# Patient Record
Sex: MALE | Race: WHITE | Employment: STUDENT | URBAN - METROPOLITAN AREA
[De-identification: names, ages, dates, MRNs, and addresses within clinical notes are randomized per-mention and may not be internally consistent; named-entity substitution may affect disease eponyms.]

---
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
## 2018-01-09 ENCOUNTER — OFFICE VISIT (OUTPATIENT)
Dept: FAMILY MEDICINE CLINIC | Age: 18
End: 2018-01-09
Payer: COMMERCIAL

## 2018-01-09 VITALS
DIASTOLIC BLOOD PRESSURE: 70 MMHG | BODY MASS INDEX: 35 KG/M2 | SYSTOLIC BLOOD PRESSURE: 120 MMHG | WEIGHT: 250 LBS | TEMPERATURE: 98.7 F | OXYGEN SATURATION: 97 % | HEART RATE: 84 BPM

## 2018-01-09 VITALS
OXYGEN SATURATION: 98 % | TEMPERATURE: 98.5 F | WEIGHT: 248 LBS | BODY MASS INDEX: 34.72 KG/M2 | SYSTOLIC BLOOD PRESSURE: 120 MMHG | DIASTOLIC BLOOD PRESSURE: 78 MMHG | HEART RATE: 96 BPM

## 2018-01-09 DIAGNOSIS — F40.10 SOCIAL ANXIETY DISORDER: ICD-10-CM

## 2018-01-09 DIAGNOSIS — G43.009 MIGRAINE WITHOUT AURA AND WITHOUT STATUS MIGRAINOSUS, NOT INTRACTABLE: ICD-10-CM

## 2018-01-09 DIAGNOSIS — F33.0 MILD EPISODE OF RECURRENT MAJOR DEPRESSIVE DISORDER (HCC): Primary | ICD-10-CM

## 2018-01-09 PROCEDURE — 1036F TOBACCO NON-USER: CPT | Performed by: NURSE PRACTITIONER

## 2018-01-09 PROCEDURE — G8417 CALC BMI ABV UP PARAM F/U: HCPCS | Performed by: NURSE PRACTITIONER

## 2018-01-09 PROCEDURE — G8484 FLU IMMUNIZE NO ADMIN: HCPCS | Performed by: NURSE PRACTITIONER

## 2018-01-09 PROCEDURE — 99213 OFFICE O/P EST LOW 20 MIN: CPT | Performed by: NURSE PRACTITIONER

## 2018-01-09 PROCEDURE — G8427 DOCREV CUR MEDS BY ELIG CLIN: HCPCS | Performed by: NURSE PRACTITIONER

## 2018-01-09 RX ORDER — TOPIRAMATE 25 MG/1
CAPSULE, COATED PELLETS ORAL
Qty: 30 CAPSULE | Refills: 0 | Status: SHIPPED | OUTPATIENT
Start: 2018-01-09 | End: 2018-04-25 | Stop reason: SINTOL

## 2018-01-09 RX ORDER — BUPROPION HYDROCHLORIDE 150 MG/1
150 TABLET ORAL EVERY MORNING
Qty: 30 TABLET | Refills: 3 | Status: SHIPPED | OUTPATIENT
Start: 2018-01-09 | End: 2018-03-12 | Stop reason: SDUPTHER

## 2018-01-09 RX ORDER — AMITRIPTYLINE HYDROCHLORIDE 25 MG/1
25 TABLET, FILM COATED ORAL NIGHTLY
Qty: 30 TABLET | Refills: 3 | Status: SHIPPED | OUTPATIENT
Start: 2018-01-09 | End: 2018-03-12 | Stop reason: SINTOL

## 2018-01-09 ASSESSMENT — ENCOUNTER SYMPTOMS
RESPIRATORY NEGATIVE: 1
EYES NEGATIVE: 1
GASTROINTESTINAL NEGATIVE: 1
ALLERGIC/IMMUNOLOGIC NEGATIVE: 1

## 2018-01-09 NOTE — PROGRESS NOTES
(ELAVIL) 25 MG tablet Take 1 tablet by mouth nightly 30 tablet 3    topiramate (TOPAMAX SPRINKLE) 25 MG capsule For 1 week take two capsules at night. For second week take 1 capsule at night. 30 capsule 0    FLUoxetine (PROZAC) 20 MG/5ML solution Take 10 mLs by mouth daily 150 mL 3    naproxen (NAPROSYN) 500 MG tablet Take 1 tablet by mouth 2 times daily (with meals) 60 tablet 3     No current facility-administered medications for this visit. No Known Allergies    Health Maintenance   Topic Date Due    Flu vaccine (1) 11/07/2018 (Originally 9/1/2017)    HIV screen  11/07/2018 (Originally 1/11/2013)    DTaP/Tdap/Td vaccine (7 - Td) 09/15/2026    Meningococcal (MCV) Vaccine Age 0-22 Years  Completed          Review of Systems   Constitutional: Negative. HENT: Negative. Eyes: Negative. Respiratory: Negative. Cardiovascular: Negative. Gastrointestinal: Negative. Endocrine: Negative. Genitourinary: Negative. Musculoskeletal: Negative. Allergic/Immunologic: Negative. Neurological: Negative. Hematological: Negative. Psychiatric/Behavioral: Positive for suicidal ideas. Flat affect, depression       Objective:     /78 (Site: Left Arm, Position: Sitting, Cuff Size: Large Adult)   Pulse 96   Temp 98.5 °F (36.9 °C) (Oral)   Wt 248 lb (112.5 kg)   SpO2 98%   BMI 34.72 kg/m²   Physical Exam   Constitutional: He appears well-developed and well-nourished. HENT:   Head: Normocephalic. Musculoskeletal: Normal range of motion. Skin: Skin is warm and dry. Nursing note and vitals reviewed. Assessment:      1. Mild episode of recurrent major depressive disorder ([facility] [address]    2. Social anxiety disorder    3. Migraine without aura and without status migrainosus, not intractable                     Plan:   1. Mild episode of recurrent major depressive disorder (HCC)    - buPROPion (WELLBUTRIN XL) 150 MG extended release tablet;  Take 1 tablet by mouth every morning

## 2018-01-22 ENCOUNTER — TELEPHONE (OUTPATIENT)
Dept: FAMILY MEDICINE CLINIC | Age: 18
End: 2018-01-22

## 2018-02-12 ENCOUNTER — OFFICE VISIT (OUTPATIENT)
Dept: FAMILY MEDICINE CLINIC | Age: 18
End: 2018-02-12
Payer: COMMERCIAL

## 2018-02-12 VITALS
HEART RATE: 98 BPM | WEIGHT: 251 LBS | DIASTOLIC BLOOD PRESSURE: 70 MMHG | OXYGEN SATURATION: 99 % | TEMPERATURE: 98.4 F | SYSTOLIC BLOOD PRESSURE: 124 MMHG | BODY MASS INDEX: 35.14 KG/M2

## 2018-02-12 DIAGNOSIS — Z23 NEED FOR INFLUENZA VACCINATION: ICD-10-CM

## 2018-02-12 DIAGNOSIS — F33.0 MILD EPISODE OF RECURRENT MAJOR DEPRESSIVE DISORDER (HCC): Primary | ICD-10-CM

## 2018-02-12 DIAGNOSIS — G43.009 MIGRAINE WITHOUT AURA AND WITHOUT STATUS MIGRAINOSUS, NOT INTRACTABLE: ICD-10-CM

## 2018-02-12 PROCEDURE — 1036F TOBACCO NON-USER: CPT | Performed by: NURSE PRACTITIONER

## 2018-02-12 PROCEDURE — 99213 OFFICE O/P EST LOW 20 MIN: CPT | Performed by: NURSE PRACTITIONER

## 2018-02-12 PROCEDURE — G8427 DOCREV CUR MEDS BY ELIG CLIN: HCPCS | Performed by: NURSE PRACTITIONER

## 2018-02-12 PROCEDURE — G8417 CALC BMI ABV UP PARAM F/U: HCPCS | Performed by: NURSE PRACTITIONER

## 2018-02-12 PROCEDURE — 90688 IIV4 VACCINE SPLT 0.5 ML IM: CPT | Performed by: NURSE PRACTITIONER

## 2018-02-12 PROCEDURE — 90471 IMMUNIZATION ADMIN: CPT | Performed by: NURSE PRACTITIONER

## 2018-02-12 PROCEDURE — G8484 FLU IMMUNIZE NO ADMIN: HCPCS | Performed by: NURSE PRACTITIONER

## 2018-02-12 ASSESSMENT — ENCOUNTER SYMPTOMS
COUGH: 0
BACK PAIN: 0
GASTROINTESTINAL NEGATIVE: 1
RESPIRATORY NEGATIVE: 1
SHORTNESS OF BREATH: 0

## 2018-02-12 NOTE — PROGRESS NOTES
Patient is present for a 1 month follow up for depression. Patient states medication is helping and he does not feel depressed. Medications and pharmacy reviewed with patient. Patient did not know that he was suppose to take the amitriptyline. So he never started it and is feeling is fine doesn't feel like he needs the medication. Visit Information    Have you changed or started any medications since your last visit including any over-the-counter medicines, vitamins, or herbal medicines? no   Have you stopped taking any of your medications? Is so, why? -  no  Are you having any side effects from any of your medications? - no    Have you seen any other physician or provider since your last visit?  no   Have you had any other diagnostic tests since your last visit?  no   Have you been seen in the emergency room and/or had an admission in a hospital since we last saw you?  no   Have you had your routine dental cleaning in the past 6 months?  no     Do you have an active MyChart account? If no, what is the barrier?   Yes    Patient Care Team:  Mamta Reece NP as PCP - General    Medical History Review  Past Medical, Family, and Social History reviewed and does contribute to the patient presenting condition    Health Maintenance   Topic Date Due    Flu vaccine (1) 11/07/2018 (Originally 9/1/2017)    HIV screen  11/07/2018 (Originally 1/11/2013)    DTaP/Tdap/Td vaccine (7 - Td) 09/15/2026    Meningococcal (MCV) Vaccine Age 0-22 Years  Completed

## 2018-02-12 NOTE — PROGRESS NOTES
[facility]  [facility]  [address] [facility] [address] [address]  Dept: [phone]  Dept Fax: [phone]      Vivian Castaneda is a 19 y.o. male who presents today for his medical conditions/complaints as noted below. Vivian Castaneda is c/o of 1 Month Follow-Up and Depression      HPI:     HPI  Jan Dixon is here for follow up on depression. He did add the wellbutrin to his daily medication. He states that he feels that his mood has improved. He is feeling good about school, he is in a programming class and his classmate will ask for his guidance. He is not talking to anyone other in the class. Migraines-he did not start the amitriptyline due to being confused that it was an antidepressant. His mom did call to clarify but she was not on HIPPA. He is getting headaches, he states he has not had any that were migraine like. He cannot afford to miss class so he does want to take the medication. He did wean himself off of the topamax. No results found for: LABA1C          ( goal A1C is < 7)   No results found for: LABMICR  No results found for: LDLCHOLESTEROL, LDLCALC    (goal LDL is <100)   AST (U/L)   Date Value   07/01/2017 27     ALT (U/L)   Date Value   07/01/2017 46 (H)     BUN (mg/dL)   Date Value   07/01/2017 16     BP Readings from Last 3 Encounters:   02/12/18 124/70   01/09/18 120/78   12/07/17 120/70          (goal 120/80)    Past Medical History:   Diagnosis Date    Headache(784.0)     Obesity       No past surgical history on file.     Family History   Problem Relation Age of Onset    Depression Mother     High Blood Pressure Father     High Cholesterol Father     Heart Disease Father     Obesity Father     Other Father     Asthma Brother     Thyroid Disease Maternal Grandmother     Heart Disease Maternal Grandfather     Diabetes Maternal Grandfather        Social History   Substance Use Topics    Smoking status: Never Smoker    Smokeless tobacco: Never Used    Alcohol use No      Current Outpatient Prescriptions   Medication Sig Dispense Refill    buPROPion (WELLBUTRIN XL) 150 MG extended release tablet Take 1 tablet by mouth every morning 30 tablet 3    topiramate (TOPAMAX SPRINKLE) 25 MG capsule For 1 week take two capsules at night. For second week take 1 capsule at night. 30 capsule 0    FLUoxetine (PROZAC) 20 MG/5ML solution Take 10 mLs by mouth daily 150 mL 3    naproxen (NAPROSYN) 500 MG tablet Take 1 tablet by mouth 2 times daily (with meals) 60 tablet 3    amitriptyline (ELAVIL) 25 MG tablet Take 1 tablet by mouth nightly 30 tablet 3     No current facility-administered medications for this visit. No Known Allergies    Health Maintenance   Topic Date Due    HIV screen  11/07/2018 (Originally 1/11/2013)    DTaP/Tdap/Td vaccine (7 - Td) 09/15/2026    Meningococcal (MCV) Vaccine Age 0-22 Years  Completed    Flu vaccine  Completed          Review of Systems   Constitutional: Negative. HENT: Negative. Respiratory: Negative. Negative for cough and shortness of breath. Cardiovascular: Negative. Negative for chest pain. Gastrointestinal: Negative. Endocrine: Negative. Musculoskeletal: Negative. Negative for arthralgias and back pain. Neurological: Negative. Negative for seizures. Psychiatric/Behavioral: Positive for dysphoric mood. Negative for self-injury, sleep disturbance and suicidal ideas. Objective:     /70 (Site: Left Arm, Position: Sitting, Cuff Size: Large Adult)   Pulse 98   Temp 98.4 °F (36.9 °C) (Oral)   Wt 251 lb (113.9 kg)   SpO2 99%   BMI 35.14 kg/m²   Physical Exam   Constitutional: He appears well-developed and well-nourished. Obese     HENT:   Head: Normocephalic. Cardiovascular: Normal rate, regular rhythm and normal heart sounds. Pulmonary/Chest: Effort normal and breath sounds normal.   Musculoskeletal: Normal range of motion. Skin: Skin is warm and dry.    Psychiatric: He

## 2018-02-28 DIAGNOSIS — F33.0 MILD EPISODE OF RECURRENT MAJOR DEPRESSIVE DISORDER (HCC): ICD-10-CM

## 2018-02-28 DIAGNOSIS — F40.10 SOCIAL ANXIETY DISORDER: ICD-10-CM

## 2018-02-28 RX ORDER — FLUOXETINE HYDROCHLORIDE 20 MG/5ML
LIQUID ORAL
Qty: 150 ML | Refills: 3 | Status: SHIPPED | OUTPATIENT
Start: 2018-02-28 | End: 2018-03-12 | Stop reason: ALTCHOICE

## 2018-02-28 NOTE — TELEPHONE ENCOUNTER
Last visit 2-12-18  Next Visit Date:  Future Appointments  Date Time Provider Emilia Fish   3/12/2018 1:15 PM Miriam Maciel  [address] Maintenance   Topic Date Due    HIV screen  11/07/2018 (Originally 1/11/2013)    DTaP/Tdap/Td vaccine (7 - Td) 09/15/2026    Meningococcal (MCV) Vaccine Age 0-22 Years  Completed    Flu vaccine  Completed       No results found for: LABA1C          ( goal A1C is < 7)   No results found for: LABMICR  No results found for: LDLCHOLESTEROL, LDLCALC    (goal LDL is <100)   AST (U/L)   Date Value   07/01/2017 27     ALT (U/L)   Date Value   07/01/2017 46 (H)     BUN (mg/dL)   Date Value   07/01/2017 16     BP Readings from Last 3 Encounters:   02/12/18 124/70   01/09/18 120/78   12/07/17 120/70          (goal 120/80)    All Future Testing planned in [facility]              Patient Active Problem List:     Obesity     Skin lesions     Muscle strain     Depression     Social anxiety disorder     Concentration deficit     Migraine without aura and without status migrainosus, not intractable

## 2018-03-12 ENCOUNTER — OFFICE VISIT (OUTPATIENT)
Dept: FAMILY MEDICINE CLINIC | Age: 18
End: 2018-03-12
Payer: COMMERCIAL

## 2018-03-12 VITALS
OXYGEN SATURATION: 98 % | WEIGHT: 256 LBS | HEART RATE: 85 BPM | SYSTOLIC BLOOD PRESSURE: 110 MMHG | TEMPERATURE: 98 F | DIASTOLIC BLOOD PRESSURE: 68 MMHG | BODY MASS INDEX: 35.84 KG/M2

## 2018-03-12 DIAGNOSIS — F33.0 MILD EPISODE OF RECURRENT MAJOR DEPRESSIVE DISORDER (HCC): ICD-10-CM

## 2018-03-12 DIAGNOSIS — F40.10 SOCIAL ANXIETY DISORDER: ICD-10-CM

## 2018-03-12 PROCEDURE — 1036F TOBACCO NON-USER: CPT | Performed by: NURSE PRACTITIONER

## 2018-03-12 PROCEDURE — G8417 CALC BMI ABV UP PARAM F/U: HCPCS | Performed by: NURSE PRACTITIONER

## 2018-03-12 PROCEDURE — G8482 FLU IMMUNIZE ORDER/ADMIN: HCPCS | Performed by: NURSE PRACTITIONER

## 2018-03-12 PROCEDURE — 99213 OFFICE O/P EST LOW 20 MIN: CPT | Performed by: NURSE PRACTITIONER

## 2018-03-12 PROCEDURE — G8427 DOCREV CUR MEDS BY ELIG CLIN: HCPCS | Performed by: NURSE PRACTITIONER

## 2018-03-12 RX ORDER — AMITRIPTYLINE HYDROCHLORIDE 25 MG/1
25 TABLET, FILM COATED ORAL NIGHTLY
Qty: 30 TABLET | Refills: 3 | Status: CANCELLED | OUTPATIENT
Start: 2018-03-12

## 2018-03-12 RX ORDER — BUPROPION HYDROCHLORIDE 150 MG/1
150 TABLET ORAL EVERY MORNING
Qty: 30 TABLET | Refills: 3 | Status: SHIPPED | OUTPATIENT
Start: 2018-03-12 | End: 2018-06-25 | Stop reason: ALTCHOICE

## 2018-03-12 RX ORDER — FLUOXETINE HYDROCHLORIDE 40 MG/1
40 CAPSULE ORAL DAILY
Qty: 30 CAPSULE | Refills: 3 | Status: SHIPPED | OUTPATIENT
Start: 2018-03-12 | End: 2018-07-29 | Stop reason: SDUPTHER

## 2018-03-12 ASSESSMENT — ENCOUNTER SYMPTOMS
COUGH: 0
BACK PAIN: 0
SHORTNESS OF BREATH: 0
GASTROINTESTINAL NEGATIVE: 1
RESPIRATORY NEGATIVE: 1

## 2018-03-12 ASSESSMENT — PATIENT HEALTH QUESTIONNAIRE - PHQ9
SUM OF ALL RESPONSES TO PHQ9 QUESTIONS 1 & 2: 2
2. FEELING DOWN, DEPRESSED OR HOPELESS: 1
1. LITTLE INTEREST OR PLEASURE IN DOING THINGS: 1
SUM OF ALL RESPONSES TO PHQ QUESTIONS 1-9: 2

## 2018-03-12 NOTE — PATIENT INSTRUCTIONS
combination of the two can help most people with depression. Often a combination works best. Counseling can also help you cope with having a chronic disease. When should you call for help? Call 911 anytime you think you may need emergency care. For example, call if:  ? · You feel like hurting yourself or someone else. ? · Someone you know has depression and is about to attempt or is attempting suicide. ?Call your doctor now or seek immediate medical care if:  ? · You hear voices. ? · Someone you know has depression and:  ¨ Starts to give away his or her possessions. ¨ Uses illegal drugs or drinks alcohol heavily. ¨ Talks or writes about death, including writing suicide notes or talking about guns, knives, or pills. ¨ Starts to spend a lot of time alone. ¨ Acts very aggressively or suddenly appears calm. ? Watch closely for changes in your health, and be sure to contact your doctor if:  ? · You do not get better as expected. Where can you learn more? Go to [email or web address]. org and sign in to your [employer] account. Enter Q473 in the K[employer]fer[employer]al box to learn more about \"Depression and Chronic Disease: Care Instructions. \"     If you do not have an account, please click on the \"Sign Up Now\" link. Current as of: May 12, 2017  Content Version: 11.5  © 1194-0742 Healthwise, Incorporated. Care instructions adapted under license by [facility] ([facility]). If you have questions about a medical condition or this instruction, always ask your healthcare professional. [place] [address] any warranty or liability for your use of this information. Patient Education        Anxiety Disorder: Care Instructions  Your Care Instructions    Anxiety is a normal reaction to stress. Difficult situations can cause you to have symptoms such as sweaty palms and a nervous feeling. In an anxiety disorder, the symptoms are far more severe.  Constant worry, muscle tension, trouble

## 2018-03-12 NOTE — PROGRESS NOTES
FLUoxetine (PROZAC) 40 MG capsule     Sig: Take 1 capsule by mouth daily     Dispense:  30 capsule     Refill:  3     Increase prozac to 40 mg  Stop amitriptyline, continue on topamax  RTO 1-2 mo    No Follow-up on file. Patient given educational materials - see patient instructions. Discussed use, benefit, and side effects of prescribed medications. All patient questions answered. Pt voiced understanding. Reviewed health maintenance. Instructed to continue current medications, diet and exercise. Patient agreed with treatment plan. Follow up as directed.      Electronically signed by Vanessa Rawls NP on 3/12/2018

## 2018-04-10 DIAGNOSIS — R41.840 CONCENTRATION DEFICIT: Primary | ICD-10-CM

## 2018-04-25 ENCOUNTER — HOSPITAL ENCOUNTER (OUTPATIENT)
Age: 18
Setting detail: SPECIMEN
Discharge: HOME | End: 2018-04-25
Payer: COMMERCIAL

## 2018-04-25 ENCOUNTER — OFFICE VISIT (OUTPATIENT)
Dept: FAMILY MEDICINE CLINIC | Age: 18
End: 2018-04-25
Payer: COMMERCIAL

## 2018-04-25 VITALS
SYSTOLIC BLOOD PRESSURE: 120 MMHG | DIASTOLIC BLOOD PRESSURE: 78 MMHG | BODY MASS INDEX: 36.12 KG/M2 | TEMPERATURE: 98.4 F | OXYGEN SATURATION: 99 % | HEART RATE: 82 BPM | WEIGHT: 258 LBS

## 2018-04-25 DIAGNOSIS — Z13.220 SCREENING FOR HYPERLIPIDEMIA: ICD-10-CM

## 2018-04-25 DIAGNOSIS — Z79.899 MEDICATION MANAGEMENT: ICD-10-CM

## 2018-04-25 DIAGNOSIS — Z11.4 SCREENING FOR HIV (HUMAN IMMUNODEFICIENCY VIRUS): ICD-10-CM

## 2018-04-25 DIAGNOSIS — F33.0 MILD EPISODE OF RECURRENT MAJOR DEPRESSIVE DISORDER (HCC): ICD-10-CM

## 2018-04-25 DIAGNOSIS — R73.09 ELEVATED GLUCOSE: ICD-10-CM

## 2018-04-25 DIAGNOSIS — F40.10 SOCIAL ANXIETY DISORDER: Primary | ICD-10-CM

## 2018-04-25 LAB
ALBUMIN SERPL-MCNC: 4.4 G/DL (ref 3.5–5.2)
ALBUMIN/GLOBULIN RATIO: 1.5 (ref 1–2.5)
ALP BLD-CCNC: 74 U/L (ref 40–129)
ALT SERPL-CCNC: 25 U/L (ref 5–41)
ANION GAP SERPL CALCULATED.3IONS-SCNC: 13 MMOL/L (ref 9–17)
AST SERPL-CCNC: 20 U/L
BILIRUB SERPL-MCNC: 0.55 MG/DL (ref 0.3–1.2)
BUN BLDV-MCNC: 12 MG/DL (ref 6–20)
BUN/CREAT BLD: ABNORMAL (ref 9–20)
CALCIUM SERPL-MCNC: 9 MG/DL (ref 8.6–10.4)
CHLORIDE BLD-SCNC: 104 MMOL/L (ref 98–107)
CHOLESTEROL/HDL RATIO: 5
CHOLESTEROL: 194 MG/DL
CO2: 24 MMOL/L (ref 20–31)
CREAT SERPL-MCNC: 0.68 MG/DL (ref 0.7–1.2)
ESTIMATED AVERAGE GLUCOSE: 94 MG/DL
GFR AFRICAN AMERICAN: >60 ML/MIN
GFR NON-AFRICAN AMERICAN: >60 ML/MIN
GFR SERPL CREATININE-BSD FRML MDRD: ABNORMAL ML/MIN/{1.73_M2}
GFR SERPL CREATININE-BSD FRML MDRD: ABNORMAL ML/MIN/{1.73_M2}
GLUCOSE BLD-MCNC: 84 MG/DL (ref 70–99)
HBA1C MFR BLD: 4.9 % (ref 4–6)
HCT VFR BLD CALC: 46.3 % (ref 40.7–50.3)
HDLC SERPL-MCNC: 39 MG/DL
HEMOGLOBIN: 15.5 G/DL (ref 13–17)
HIV AG/AB: NONREACTIVE
LDL CHOLESTEROL: 128 MG/DL (ref 0–130)
MCH RBC QN AUTO: 28.7 PG (ref 25.2–33.5)
MCHC RBC AUTO-ENTMCNC: 33.5 G/DL (ref 28.4–34.8)
MCV RBC AUTO: 85.7 FL (ref 82.6–102.9)
NRBC AUTOMATED: 0 PER 100 WBC
PDW BLD-RTO: 12.7 % (ref 11.8–14.4)
PLATELET # BLD: 246 K/UL (ref 138–453)
PMV BLD AUTO: 11.4 FL (ref 8.1–13.5)
POTASSIUM SERPL-SCNC: 4.4 MMOL/L (ref 3.7–5.3)
RBC # BLD: 5.4 M/UL (ref 4.21–5.77)
SODIUM BLD-SCNC: 141 MMOL/L (ref 135–144)
TOTAL PROTEIN: 7.3 G/DL (ref 6.4–8.3)
TRIGL SERPL-MCNC: 133 MG/DL
VLDLC SERPL CALC-MCNC: ABNORMAL MG/DL (ref 1–30)
WBC # BLD: 5.8 K/UL (ref 4.5–13.5)

## 2018-04-25 PROCEDURE — G8427 DOCREV CUR MEDS BY ELIG CLIN: HCPCS | Performed by: NURSE PRACTITIONER

## 2018-04-25 PROCEDURE — 99214 OFFICE O/P EST MOD 30 MIN: CPT | Performed by: NURSE PRACTITIONER

## 2018-04-25 PROCEDURE — 1036F TOBACCO NON-USER: CPT | Performed by: NURSE PRACTITIONER

## 2018-04-25 PROCEDURE — G8417 CALC BMI ABV UP PARAM F/U: HCPCS | Performed by: NURSE PRACTITIONER

## 2018-04-25 ASSESSMENT — ENCOUNTER SYMPTOMS
SHORTNESS OF BREATH: 0
COUGH: 0
GASTROINTESTINAL NEGATIVE: 1
BACK PAIN: 0
RESPIRATORY NEGATIVE: 1

## 2018-06-25 ENCOUNTER — OFFICE VISIT (OUTPATIENT)
Dept: FAMILY MEDICINE CLINIC | Age: 18
End: 2018-06-25
Payer: COMMERCIAL

## 2018-06-25 VITALS
TEMPERATURE: 98.5 F | OXYGEN SATURATION: 98 % | SYSTOLIC BLOOD PRESSURE: 122 MMHG | BODY MASS INDEX: 35.84 KG/M2 | DIASTOLIC BLOOD PRESSURE: 88 MMHG | WEIGHT: 256 LBS | HEART RATE: 88 BPM

## 2018-06-25 DIAGNOSIS — F33.0 MILD EPISODE OF RECURRENT MAJOR DEPRESSIVE DISORDER (HCC): Primary | ICD-10-CM

## 2018-06-25 DIAGNOSIS — F40.10 SOCIAL ANXIETY DISORDER: ICD-10-CM

## 2018-06-25 PROCEDURE — 1036F TOBACCO NON-USER: CPT | Performed by: NURSE PRACTITIONER

## 2018-06-25 PROCEDURE — 99213 OFFICE O/P EST LOW 20 MIN: CPT | Performed by: NURSE PRACTITIONER

## 2018-06-25 PROCEDURE — G8417 CALC BMI ABV UP PARAM F/U: HCPCS | Performed by: NURSE PRACTITIONER

## 2018-06-25 PROCEDURE — G8427 DOCREV CUR MEDS BY ELIG CLIN: HCPCS | Performed by: NURSE PRACTITIONER

## 2018-06-25 RX ORDER — ARIPIPRAZOLE 2 MG/1
2 TABLET ORAL DAILY
Qty: 30 TABLET | Refills: 2 | Status: SHIPPED | OUTPATIENT
Start: 2018-06-25 | End: 2018-08-08 | Stop reason: SINTOL

## 2018-06-25 ASSESSMENT — ENCOUNTER SYMPTOMS
SHORTNESS OF BREATH: 0
RESPIRATORY NEGATIVE: 1
BACK PAIN: 0
COUGH: 0
GASTROINTESTINAL NEGATIVE: 1

## 2018-07-30 RX ORDER — FLUOXETINE HYDROCHLORIDE 40 MG/1
CAPSULE ORAL
Qty: 90 CAPSULE | Refills: 1 | Status: SHIPPED | OUTPATIENT
Start: 2018-07-30 | End: 2018-09-11 | Stop reason: ALTCHOICE

## 2018-08-08 ENCOUNTER — OFFICE VISIT (OUTPATIENT)
Dept: FAMILY MEDICINE CLINIC | Age: 18
End: 2018-08-08
Payer: COMMERCIAL

## 2018-08-08 VITALS
OXYGEN SATURATION: 97 % | BODY MASS INDEX: 37.38 KG/M2 | TEMPERATURE: 98.5 F | HEART RATE: 78 BPM | SYSTOLIC BLOOD PRESSURE: 112 MMHG | WEIGHT: 267 LBS | DIASTOLIC BLOOD PRESSURE: 80 MMHG

## 2018-08-08 DIAGNOSIS — F40.10 SOCIAL ANXIETY DISORDER: ICD-10-CM

## 2018-08-08 DIAGNOSIS — F33.0 MILD EPISODE OF RECURRENT MAJOR DEPRESSIVE DISORDER (HCC): Primary | ICD-10-CM

## 2018-08-08 PROCEDURE — G8427 DOCREV CUR MEDS BY ELIG CLIN: HCPCS | Performed by: NURSE PRACTITIONER

## 2018-08-08 PROCEDURE — G8417 CALC BMI ABV UP PARAM F/U: HCPCS | Performed by: NURSE PRACTITIONER

## 2018-08-08 PROCEDURE — 1036F TOBACCO NON-USER: CPT | Performed by: NURSE PRACTITIONER

## 2018-08-08 PROCEDURE — 99213 OFFICE O/P EST LOW 20 MIN: CPT | Performed by: NURSE PRACTITIONER

## 2018-08-08 ASSESSMENT — ENCOUNTER SYMPTOMS
GASTROINTESTINAL NEGATIVE: 1
COUGH: 0
SHORTNESS OF BREATH: 0
BACK PAIN: 0
RESPIRATORY NEGATIVE: 1

## 2018-08-08 NOTE — PROGRESS NOTES
[facility]  [place] [address] [facility] [address]  Dept: [phone]  Dept Fax: [phone]      Kenyon Kelly is a 19 y.o. male who presents today for his medical conditions/complaints as noted below. Kenyon Kelly is c/o of 1 Month Follow-Up and Depression            HPI:     HPI  Dexter Cochran is here re mental health. He is not sleeping. He does not like the abilify. He did continue taking the medication. He is taking his prozac. No plans for the remainder of summer. Slightly more talkative today. Hemoglobin A1C (%)   Date Value   04/25/2018 4.9             ( goal A1C is < 7)   No results found for: LABMICR  LDL Cholesterol (mg/dL)   Date Value   04/25/2018 128       (goal LDL is <100)   AST (U/L)   Date Value   04/25/2018 20     ALT (U/L)   Date Value   04/25/2018 25     BUN (mg/dL)   Date Value   04/25/2018 12     BP Readings from Last 3 Encounters:   08/08/18 112/80   06/25/18 122/88   04/25/18 120/78          (goal 120/80)    Past Medical History:   Diagnosis Date    Headache(784.0)     Obesity       No past surgical history on file. Family History   Problem Relation Age of Onset    Depression Mother     High Blood Pressure Father     High Cholesterol Father     Heart Disease Father     Obesity Father     Other Father     Asthma Brother     Thyroid Disease Maternal Grandmother     Heart Disease Maternal Grandfather     Diabetes Maternal Grandfather        Social History   Substance Use Topics    Smoking status: Never Smoker    Smokeless tobacco: Never Used    Alcohol use No      Current Outpatient Prescriptions   Medication Sig Dispense Refill    FLUoxetine (PROZAC) 40 MG capsule take 1 capsule by mouth once daily 90 capsule 1    naproxen (NAPROSYN) 500 MG tablet Take 1 tablet by mouth 2 times daily (with meals) 60 tablet 3     No current facility-administered medications for this visit.       No Known Allergies    Health Maintenance   Topic Date Due    Flu vaccine (1) 09/01/2018    DTaP/Tdap/Td vaccine (7 - Td) 09/15/2026    Meningococcal (MCV) Vaccine Age 0-22 Years  Completed    HIV screen  Completed          Review of Systems   Constitutional: Negative. HENT: Negative. Respiratory: Negative. Negative for cough and shortness of breath. Cardiovascular: Negative. Negative for chest pain. Gastrointestinal: Negative. Endocrine: Negative. Musculoskeletal: Negative. Negative for arthralgias and back pain. Neurological: Negative. Negative for seizures. Psychiatric/Behavioral: Positive for dysphoric mood. Negative for self-injury, sleep disturbance and suicidal ideas. Objective:     /80 (Site: Left Arm, Position: Sitting, Cuff Size: Medium Adult)   Pulse 78   Temp 98.5 °F (36.9 °C) (Oral)   Wt 267 lb (121.1 kg)   SpO2 97%   BMI 37.38 kg/m²   Physical Exam   Constitutional: He appears well-developed and well-nourished. Obese     HENT:   Head: Normocephalic. Cardiovascular: Normal rate, regular rhythm and normal heart sounds. Pulmonary/Chest: Effort normal and breath sounds normal.   Musculoskeletal: Normal range of motion. Skin: Skin is warm and dry. Psychiatric: He exhibits a depressed mood (improved eye contact). Nursing note and vitals reviewed. Assessment:      1. Mild episode of recurrent major depressive disorder ([address]    2. Social anxiety disorder                     Plan:      Orders Placed This Encounter   Procedures    [employer] Psychotropic (combinatorial pharmacogenomics test)     This test is performed by an external laboratory and is used for result attachment only. Please fill out the appropriate paperwork required by the processing laboratory. See [email or web address] for further information. Standing Status:   Future     Standing Expiration Date:   8/8/2019     Stop the abilify  Will get genetic testing and go from there. No Follow-up on file.

## 2018-09-11 ENCOUNTER — OFFICE VISIT (OUTPATIENT)
Dept: FAMILY MEDICINE CLINIC | Age: 18
End: 2018-09-11
Payer: COMMERCIAL

## 2018-09-11 VITALS
HEART RATE: 76 BPM | WEIGHT: 271 LBS | TEMPERATURE: 98.1 F | BODY MASS INDEX: 37.94 KG/M2 | SYSTOLIC BLOOD PRESSURE: 110 MMHG | DIASTOLIC BLOOD PRESSURE: 68 MMHG | OXYGEN SATURATION: 99 %

## 2018-09-11 DIAGNOSIS — F40.10 SOCIAL ANXIETY DISORDER: ICD-10-CM

## 2018-09-11 DIAGNOSIS — Z23 NEED FOR INFLUENZA VACCINATION: ICD-10-CM

## 2018-09-11 DIAGNOSIS — G43.009 MIGRAINE WITHOUT AURA AND WITHOUT STATUS MIGRAINOSUS, NOT INTRACTABLE: ICD-10-CM

## 2018-09-11 DIAGNOSIS — F33.0 MILD EPISODE OF RECURRENT MAJOR DEPRESSIVE DISORDER (HCC): ICD-10-CM

## 2018-09-11 DIAGNOSIS — F33.0 MILD EPISODE OF RECURRENT MAJOR DEPRESSIVE DISORDER (HCC): Primary | ICD-10-CM

## 2018-09-11 PROCEDURE — 96372 THER/PROPH/DIAG INJ SC/IM: CPT | Performed by: NURSE PRACTITIONER

## 2018-09-11 PROCEDURE — 90471 IMMUNIZATION ADMIN: CPT | Performed by: NURSE PRACTITIONER

## 2018-09-11 PROCEDURE — G8417 CALC BMI ABV UP PARAM F/U: HCPCS | Performed by: NURSE PRACTITIONER

## 2018-09-11 PROCEDURE — 90688 IIV4 VACCINE SPLT 0.5 ML IM: CPT | Performed by: NURSE PRACTITIONER

## 2018-09-11 PROCEDURE — G8427 DOCREV CUR MEDS BY ELIG CLIN: HCPCS | Performed by: NURSE PRACTITIONER

## 2018-09-11 PROCEDURE — 99214 OFFICE O/P EST MOD 30 MIN: CPT | Performed by: NURSE PRACTITIONER

## 2018-09-11 PROCEDURE — 1036F TOBACCO NON-USER: CPT | Performed by: NURSE PRACTITIONER

## 2018-09-11 RX ORDER — KETOROLAC TROMETHAMINE 30 MG/ML
30 INJECTION, SOLUTION INTRAMUSCULAR; INTRAVENOUS ONCE
Status: COMPLETED | OUTPATIENT
Start: 2018-09-11 | End: 2018-09-11

## 2018-09-11 RX ADMIN — KETOROLAC TROMETHAMINE 30 MG: 30 INJECTION, SOLUTION INTRAMUSCULAR; INTRAVENOUS at 09:38

## 2018-09-11 ASSESSMENT — ENCOUNTER SYMPTOMS
GASTROINTESTINAL NEGATIVE: 1
SHORTNESS OF BREATH: 0
COUGH: 0
BACK PAIN: 0
RESPIRATORY NEGATIVE: 1

## 2018-09-11 ASSESSMENT — PATIENT HEALTH QUESTIONNAIRE - PHQ9
1. LITTLE INTEREST OR PLEASURE IN DOING THINGS: 1
SUM OF ALL RESPONSES TO PHQ QUESTIONS 1-9: 2
SUM OF ALL RESPONSES TO PHQ9 QUESTIONS 1 & 2: 2
SUM OF ALL RESPONSES TO PHQ QUESTIONS 1-9: 2
2. FEELING DOWN, DEPRESSED OR HOPELESS: 1

## 2018-09-11 NOTE — PATIENT INSTRUCTIONS
using ketorolac, especially in older adults. You should not use ketorolac if you are allergic to it, or if you have:  · active or recent stomach ulcer, stomach bleeding, or intestinal bleeding;  · a bleeding or blood-clotting disorder;  · a closed head injury or bleeding in your brain;  · bleeding from a recent surgery;  · severe kidney disease or dehydration;  · a history of asthma or severe allergic reaction after taking aspirin or an NSAID;  · if you are scheduled to have surgery (especially bypass surgery); or  · if you are in late pregnancy or you are breast-feeding a baby. Some medicines can cause unwanted or dangerous effects when used with ketorolac. Your doctor may need to change your treatment plan if you use any of the following drugs:  · pentoxifylline;  · probenecid; or  · aspirin or other NSAIDs --ibuprofen (Advil, Motrin), naproxen (Aleve), celecoxib, diclofenac, indomethacin, meloxicam, and others. To make sure ketorolac is safe for you, tell your doctor if you have ever had:  · heart disease, high blood pressure, high cholesterol, diabetes, or if you smoke;  · a heart attack, stroke, or blood clot;  · stomach ulcers or bleeding;  · inflammatory bowel disease, ulcerative colitis, or Crohn's disease;  · liver disease;  · kidney disease (or if you are on dialysis);  · asthma; or  · fluid retention. Using ketorolac during the last 3 months of pregnancy may harm the unborn baby. Ketorolac may also increase the risk of uterine bleeding and is not for use during labor and delivery. Tell your doctor if you are pregnant. Ketorolac can pass into breast milk and may harm a nursing baby. Do not breast-feed while using this medicine. Ketorolac is not approved for use by anyone younger than 3years old. How should I take ketorolac? Ketorolac is usually given first as an injection, and then as an oral (by mouth) medicine. The injection is given into a muscle, or into a vein through an IV.  A [facility] and over-the-counter medicines, vitamins, and herbal products. Not all possible interactions are listed in this medication guide. Where can I get more information? Your pharmacist can provide more information about ketorolac. Remember, keep this and all other medicines out of the reach of children, never share your medicines with others, and use this medication only for the indication prescribed. Every effort has been made to ensure that the information provided by [address] [place] [address]  is accurate, up-to-date, and complete, but no guarantee is made to that effect. Drug information contained herein may be time sensitive. [facility] information has been compiled for use by healthcare practitioners and consumers in the [place] and therefore [facility] does not warrant that uses outside of the [place] are appropriate, unless specifically indicated otherwise. [facility]'s drug information does not endorse drugs, diagnose patients or recommend therapy. [facility]'s drug information is an informational resource designed to assist licensed healthcare practitioners in caring for their patients and/or to serve consumers viewing this service as a supplement to, and not a substitute for, the expertise, skill, knowledge and judgment of healthcare practitioners. The absence of a warning for a given drug or drug combination in no way should be construed to indicate that the drug or drug combination is safe, effective or appropriate for any given patient. [facility] does not assume any responsibility for any aspect of healthcare administered with the aid of information [facility] provides. The information contained herein is not intended to cover all possible uses, directions, precautions, warnings, drug interactions, allergic reactions, or adverse effects. If you have questions about the drugs you are taking, check with your doctor, nurse or pharmacist.  Copyright 6021-2450 [employer] [address] [place] [address] Avenue: 9.03.  Revision date: 11/20/2017. Care instructions adapted under license by [facility] ([facility]). If you have questions about a medical condition or this instruction, always ask your healthcare professional. [address] any warranty or liability for your use of this information.

## 2018-09-11 NOTE — PROGRESS NOTES
[facility]  [address] [address]  Dept: [phone]  Dept Fax: [phone]      Wilbur Jenkins is a 19 y.o. male who presents today for his medical conditions/complaints as noted below. Wilbur Jenkins is c/o of Depression (medication does not seem to be helping) and Headache (x 1 day)            HPI:     HPI  Santana Olivares is here today to discuss mood. He feels that the medication is making him feel worse. He feels that he is eating more on the medication. He feels that if he misses a couple of days he feels better. [facility] Citlali [facility]. He does feel that this is helping and they are trying different cognitive therapies. He is having a headache that he feels is leading to a migraine, he is rating the pain a 4. Hemoglobin A1C (%)   Date Value   04/25/2018 4.9             ( goal A1C is < 7)   No results found for: LABMICR  LDL Cholesterol (mg/dL)   Date Value   04/25/2018 128       (goal LDL is <100)   AST (U/L)   Date Value   04/25/2018 20     ALT (U/L)   Date Value   04/25/2018 25     BUN (mg/dL)   Date Value   04/25/2018 12     BP Readings from Last 3 Encounters:   09/11/18 110/68   08/08/18 112/80   06/25/18 122/88          (goal 120/80)    Past Medical History:   Diagnosis Date    Headache(784.0)     Obesity       No past surgical history on file.     Family History   Problem Relation Age of Onset    Depression Mother     High Blood Pressure Father     High Cholesterol Father     Heart Disease Father     Obesity Father     Other Father     Asthma Brother     Thyroid Disease Maternal Grandmother     Heart Disease Maternal Grandfather     Diabetes Maternal Grandfather        Social History   Substance Use Topics    Smoking status: Never Smoker    Smokeless tobacco: Never Used    Alcohol use No      Current Outpatient Prescriptions   Medication Sig Dispense Refill    Vilazodone HCl (VIIBRYD

## 2018-10-15 RX ORDER — VILAZODONE HYDROCHLORIDE 10 MG-20MG
KIT ORAL
Refills: 0 | OUTPATIENT
Start: 2018-10-15

## 2018-10-15 RX ORDER — VILAZODONE HYDROCHLORIDE 20 MG/1
20 TABLET ORAL DAILY
Qty: 30 TABLET | Refills: 0 | Status: SHIPPED | OUTPATIENT
Start: 2018-10-15 | End: 2018-10-30 | Stop reason: SDUPTHER

## 2018-10-30 ENCOUNTER — OFFICE VISIT (OUTPATIENT)
Dept: FAMILY MEDICINE CLINIC | Age: 18
End: 2018-10-30
Payer: COMMERCIAL

## 2018-10-30 VITALS
SYSTOLIC BLOOD PRESSURE: 110 MMHG | HEART RATE: 72 BPM | DIASTOLIC BLOOD PRESSURE: 68 MMHG | TEMPERATURE: 98.2 F | BODY MASS INDEX: 38.22 KG/M2 | WEIGHT: 273 LBS | OXYGEN SATURATION: 99 %

## 2018-10-30 DIAGNOSIS — F40.10 SOCIAL ANXIETY DISORDER: ICD-10-CM

## 2018-10-30 DIAGNOSIS — F33.0 MILD EPISODE OF RECURRENT MAJOR DEPRESSIVE DISORDER (HCC): Primary | ICD-10-CM

## 2018-10-30 PROCEDURE — 99213 OFFICE O/P EST LOW 20 MIN: CPT | Performed by: NURSE PRACTITIONER

## 2018-10-30 PROCEDURE — G8417 CALC BMI ABV UP PARAM F/U: HCPCS | Performed by: NURSE PRACTITIONER

## 2018-10-30 PROCEDURE — G8427 DOCREV CUR MEDS BY ELIG CLIN: HCPCS | Performed by: NURSE PRACTITIONER

## 2018-10-30 PROCEDURE — G8482 FLU IMMUNIZE ORDER/ADMIN: HCPCS | Performed by: NURSE PRACTITIONER

## 2018-10-30 PROCEDURE — 1036F TOBACCO NON-USER: CPT | Performed by: NURSE PRACTITIONER

## 2018-10-30 RX ORDER — VILAZODONE HYDROCHLORIDE 40 MG/1
40 TABLET ORAL DAILY
Qty: 90 TABLET | Refills: 0 | Status: SHIPPED | OUTPATIENT
Start: 2018-10-30 | End: 2019-02-28

## 2018-10-30 ASSESSMENT — ENCOUNTER SYMPTOMS
BACK PAIN: 0
GASTROINTESTINAL NEGATIVE: 1
COUGH: 0
RESPIRATORY NEGATIVE: 1
SHORTNESS OF BREATH: 0

## 2018-10-30 NOTE — PROGRESS NOTES
Patient is present for a f/u for depression. Patient states depression is stable. Patient states medication is helping with depression. Patient states that he feels good. Medications and pharmacy reviewed with patient. Patient is wondering if medication should be increased or not, states that it is working. Visit Information    Have you changed or started any medications since your last visit including any over-the-counter medicines, vitamins, or herbal medicines? no   Have you stopped taking any of your medications? Is so, why? -  no  Are you having any side effects from any of your medications? - no    Have you seen any other physician or provider since your last visit?  no   Have you had any other diagnostic tests since your last visit?  no   Have you been seen in the emergency room and/or had an admission in a hospital since we last saw you?  no   Have you had your routine dental cleaning in the past 6 months?  yes -      Do you have an active [employer] account? If no, what is the barrier?   Yes    Patient Care Team:  EUGENIO Martin - CNP as PCP - General    Medical History Review  Past Medical, Family, and Social History reviewed and does contribute to the patient presenting condition    Health Maintenance   Topic Date Due    DTaP/Tdap/Td vaccine (7 - Td) 09/15/2026    Meningococcal (MCV) Vaccine Age 0-22 Years  Completed    Flu vaccine  Completed    HIV screen  Completed

## 2019-02-28 ENCOUNTER — OFFICE VISIT (OUTPATIENT)
Dept: FAMILY MEDICINE CLINIC | Age: 19
End: 2019-02-28
Payer: COMMERCIAL

## 2019-02-28 VITALS
HEART RATE: 72 BPM | BODY MASS INDEX: 39.48 KG/M2 | SYSTOLIC BLOOD PRESSURE: 120 MMHG | OXYGEN SATURATION: 99 % | TEMPERATURE: 98.5 F | HEIGHT: 71 IN | DIASTOLIC BLOOD PRESSURE: 70 MMHG | WEIGHT: 282 LBS

## 2019-02-28 DIAGNOSIS — F40.10 SOCIAL ANXIETY DISORDER: ICD-10-CM

## 2019-02-28 DIAGNOSIS — F33.0 MILD EPISODE OF RECURRENT MAJOR DEPRESSIVE DISORDER (HCC): Primary | ICD-10-CM

## 2019-02-28 DIAGNOSIS — R41.840 CONCENTRATION DEFICIT: ICD-10-CM

## 2019-02-28 PROCEDURE — 99213 OFFICE O/P EST LOW 20 MIN: CPT | Performed by: NURSE PRACTITIONER

## 2019-02-28 PROCEDURE — G8482 FLU IMMUNIZE ORDER/ADMIN: HCPCS | Performed by: NURSE PRACTITIONER

## 2019-02-28 PROCEDURE — G8417 CALC BMI ABV UP PARAM F/U: HCPCS | Performed by: NURSE PRACTITIONER

## 2019-02-28 PROCEDURE — 1036F TOBACCO NON-USER: CPT | Performed by: NURSE PRACTITIONER

## 2019-02-28 PROCEDURE — G8427 DOCREV CUR MEDS BY ELIG CLIN: HCPCS | Performed by: NURSE PRACTITIONER

## 2019-02-28 ASSESSMENT — ENCOUNTER SYMPTOMS
BACK PAIN: 0
SHORTNESS OF BREATH: 0
GASTROINTESTINAL NEGATIVE: 1
RESPIRATORY NEGATIVE: 1
COUGH: 0

## 2019-02-28 ASSESSMENT — PATIENT HEALTH QUESTIONNAIRE - PHQ9
1. LITTLE INTEREST OR PLEASURE IN DOING THINGS: 1
SUM OF ALL RESPONSES TO PHQ9 QUESTIONS 1 & 2: 2
2. FEELING DOWN, DEPRESSED OR HOPELESS: 1
SUM OF ALL RESPONSES TO PHQ QUESTIONS 1-9: 2
SUM OF ALL RESPONSES TO PHQ QUESTIONS 1-9: 2

## 2019-09-12 ENCOUNTER — OFFICE VISIT (OUTPATIENT)
Dept: FAMILY MEDICINE CLINIC | Age: 19
End: 2019-09-12
Payer: COMMERCIAL

## 2019-09-12 VITALS
OXYGEN SATURATION: 98 % | SYSTOLIC BLOOD PRESSURE: 123 MMHG | TEMPERATURE: 98.8 F | HEART RATE: 67 BPM | DIASTOLIC BLOOD PRESSURE: 81 MMHG | WEIGHT: 281 LBS | HEIGHT: 71 IN | BODY MASS INDEX: 39.34 KG/M2

## 2019-09-12 DIAGNOSIS — F33.0 MILD EPISODE OF RECURRENT MAJOR DEPRESSIVE DISORDER (HCC): ICD-10-CM

## 2019-09-12 DIAGNOSIS — L70.0 ACNE VULGARIS: Primary | ICD-10-CM

## 2019-09-12 PROCEDURE — G8417 CALC BMI ABV UP PARAM F/U: HCPCS | Performed by: NURSE PRACTITIONER

## 2019-09-12 PROCEDURE — G8427 DOCREV CUR MEDS BY ELIG CLIN: HCPCS | Performed by: NURSE PRACTITIONER

## 2019-09-12 PROCEDURE — 99213 OFFICE O/P EST LOW 20 MIN: CPT | Performed by: NURSE PRACTITIONER

## 2019-09-12 PROCEDURE — 90688 IIV4 VACCINE SPLT 0.5 ML IM: CPT | Performed by: NURSE PRACTITIONER

## 2019-09-12 PROCEDURE — 1036F TOBACCO NON-USER: CPT | Performed by: NURSE PRACTITIONER

## 2019-09-12 PROCEDURE — 90471 IMMUNIZATION ADMIN: CPT | Performed by: NURSE PRACTITIONER

## 2019-09-12 RX ORDER — VORTIOXETINE 10 MG/1
TABLET, FILM COATED ORAL
Refills: 0 | COMMUNITY
Start: 2019-09-05 | End: 2019-12-31 | Stop reason: ALTCHOICE

## 2019-09-12 ASSESSMENT — ENCOUNTER SYMPTOMS
WHEEZING: 0
COUGH: 0
ROS SKIN COMMENTS: ACNE
SHORTNESS OF BREATH: 0
RESPIRATORY NEGATIVE: 1

## 2019-09-12 NOTE — PROGRESS NOTES
(with meals) 60 tablet 3     No current facility-administered medications for this visit. No Known Allergies    Health Maintenance   Topic Date Due    Varicella Vaccine (2 of 2 - 2-dose childhood series) 01/11/2002    HPV vaccine (1 - Male 3-dose series) 01/11/2013    DTaP/Tdap/Td vaccine (7 - Td) 09/15/2026    Meningococcal (ACWY) Vaccine  Completed    Flu vaccine  Completed    HIV screen  Completed    Pneumococcal 0-64 years Vaccine  Aged Out          Review of Systems   Constitutional: Negative. Respiratory: Negative. Negative for cough, shortness of breath and wheezing. Cardiovascular: Negative. Negative for chest pain and palpitations. Musculoskeletal: Negative. Negative for arthralgias. Skin: Negative. Acne     Neurological: Negative for syncope and headaches. Hematological: Negative. Psychiatric/Behavioral: Positive for dysphoric mood. Negative for sleep disturbance. The patient is nervous/anxious. Objective:     /81 (Site: Right Upper Arm, Position: Sitting, Cuff Size: Medium Adult) Comment: manual  Pulse 67   Temp 98.8 °F (37.1 °C) (Oral)   Ht 5' 10.98\" (1.803 m)   Wt 281 lb (127.5 kg)   SpO2 98%   BMI 39.21 kg/m²   Physical Exam   Constitutional: He is oriented to person, place, and time. Vital signs are normal. He appears well-developed and well-nourished. HENT:   Head: Normocephalic and atraumatic. Neck: Normal range of motion. Cardiovascular: Normal rate, regular rhythm and normal heart sounds. No murmur heard. Pulmonary/Chest: Effort normal and breath sounds normal. No respiratory distress. Musculoskeletal: Normal range of motion. Neurological: He is alert and oriented to person, place, and time. Skin: Skin is warm and dry. Scattered pustules over the upper back. Psychiatric: He has a normal mood and affect. His behavior is normal. Judgment and thought content normal.   Vitals reviewed. Assessment:      1.  Acne vulgaris

## 2019-12-31 ENCOUNTER — OFFICE VISIT (OUTPATIENT)
Dept: DERMATOLOGY | Age: 19
End: 2019-12-31
Payer: COMMERCIAL

## 2019-12-31 VITALS
BODY MASS INDEX: 39.7 KG/M2 | WEIGHT: 283.6 LBS | SYSTOLIC BLOOD PRESSURE: 108 MMHG | OXYGEN SATURATION: 96 % | HEIGHT: 71 IN | DIASTOLIC BLOOD PRESSURE: 70 MMHG | HEART RATE: 89 BPM

## 2019-12-31 DIAGNOSIS — L70.0 ACNE VULGARIS: Primary | ICD-10-CM

## 2019-12-31 DIAGNOSIS — L91.8 SKIN TAG: ICD-10-CM

## 2019-12-31 DIAGNOSIS — D22.9 BENIGN NEVUS: ICD-10-CM

## 2019-12-31 PROCEDURE — 99202 OFFICE O/P NEW SF 15 MIN: CPT | Performed by: DERMATOLOGY

## 2019-12-31 PROCEDURE — 1036F TOBACCO NON-USER: CPT | Performed by: DERMATOLOGY

## 2019-12-31 PROCEDURE — G8417 CALC BMI ABV UP PARAM F/U: HCPCS | Performed by: DERMATOLOGY

## 2019-12-31 PROCEDURE — G8427 DOCREV CUR MEDS BY ELIG CLIN: HCPCS | Performed by: DERMATOLOGY

## 2019-12-31 PROCEDURE — G8482 FLU IMMUNIZE ORDER/ADMIN: HCPCS | Performed by: DERMATOLOGY

## 2019-12-31 RX ORDER — DOXEPIN HYDROCHLORIDE 50 MG/1
CAPSULE ORAL
COMMUNITY
Start: 2019-12-20 | End: 2020-07-08

## 2019-12-31 RX ORDER — CLINDAMYCIN PHOSPHATE 10 UG/ML
LOTION TOPICAL
Qty: 60 ML | Refills: 3 | Status: SHIPPED | OUTPATIENT
Start: 2019-12-31 | End: 2020-03-31 | Stop reason: SDUPTHER

## 2019-12-31 RX ORDER — LITHIUM CARBONATE 300 MG/1
300 CAPSULE ORAL DAILY
COMMUNITY
End: 2021-01-20 | Stop reason: ALTCHOICE

## 2020-02-24 ENCOUNTER — HOSPITAL ENCOUNTER (OUTPATIENT)
Age: 20
Setting detail: SPECIMEN
Discharge: HOME | End: 2020-02-24
Payer: COMMERCIAL

## 2020-02-24 LAB
LITHIUM DATE LAST DOSE: ABNORMAL
LITHIUM DOSE AMOUNT: ABNORMAL
LITHIUM DOSE TIME: ABNORMAL
LITHIUM LEVEL: 0.4 MMOL/L (ref 0.6–1.2)

## 2020-03-31 ENCOUNTER — TELEMEDICINE (OUTPATIENT)
Dept: DERMATOLOGY | Age: 20
End: 2020-03-31
Payer: COMMERCIAL

## 2020-03-31 PROCEDURE — G8428 CUR MEDS NOT DOCUMENT: HCPCS | Performed by: DERMATOLOGY

## 2020-03-31 PROCEDURE — 99213 OFFICE O/P EST LOW 20 MIN: CPT | Performed by: DERMATOLOGY

## 2020-03-31 RX ORDER — CLINDAMYCIN PHOSPHATE 10 UG/ML
LOTION TOPICAL
Qty: 60 ML | Refills: 3 | Status: SHIPPED | OUTPATIENT
Start: 2020-03-31 | End: 2020-07-08 | Stop reason: SDUPTHER

## 2020-03-31 NOTE — PATIENT INSTRUCTIONS
immediately prior to the topical medication helps, and studies suggest that it does not reduce effectiveness. Please have your pharmacist call our office if you are having trouble getting your medications or need refills. Some insurance companies will not cover tretinoin; however, you may shop around for the best out-of-pocket price using the coupon website [employer]. Topical and oral acne medications are not safe for pregnant women. No acne medications should be used by pregnant women without first consulting their physician.

## 2020-03-31 NOTE — PROGRESS NOTES
TELEHEALTH EVALUATION -- Audio/Visual (During SWXCM-70 public health emergency)    Dermatology Patient Note  [facility] [address] [address]  Dept: [phone]  Dept Fax: [phone]      VISIT DATE: 3/31/2020   REFERRING PROVIDER: No ref. provider found      Estella Hollingsworth is a 23 y.o. male  who presents today in the office for:    No chief complaint on file. HISTORY OF PRESENT ILLNESS:  Patient presents for f/u acne  Interval history: patient states that the painful bumps that were on his back have resolved, but he is still has red bumps. Face is doing better  Current treatment and adherence: tretinoin cream only. He believes his BPO wash and clindamycin lotion were stolen after a few weeks  Prior treatments tried: see initial HPI      CURRENT MEDICATIONS:   Current Outpatient Medications   Medication Sig Dispense Refill    tretinoin (RETIN-A) 0.025 % cream Apply pea sized amount to forehead daily 45 g 3    clindamycin (CLEOCIN T) 1 % lotion Apply to face, chest and back daily 60 mL 3    benzoyl peroxide 5 % external liquid Wash face, chest and back 1-2 times daily 227 g 3    lithium 300 MG capsule Take 300 mg by mouth 3 times daily (with meals)      doxepin (SINEQUAN) 50 MG capsule       naproxen (NAPROSYN) 500 MG tablet Take 1 tablet by mouth 2 times daily (with meals) 60 tablet 3     No current facility-administered medications for this visit. ALLERGIES:   No Known Allergies    SOCIAL HISTORY:  Social History     Tobacco Use    Smoking status: Never Smoker    Smokeless tobacco: Never Used   Substance Use Topics    Alcohol use: No     Alcohol/week: 0.0 standard drinks       REVIEW OF SYSTEMS:  Review of Systems  Skin:Denies any new changing, growing or bleeding lesions or rashes except as described in the HPI     PHYSICAL EXAM:   There were no vitals taken for this visit.     General Exam:  General Appearance: No acute distress,

## 2020-07-08 ENCOUNTER — OFFICE VISIT (OUTPATIENT)
Dept: DERMATOLOGY | Age: 20
End: 2020-07-08
Payer: COMMERCIAL

## 2020-07-08 VITALS
WEIGHT: 281 LBS | HEIGHT: 71 IN | BODY MASS INDEX: 39.34 KG/M2 | SYSTOLIC BLOOD PRESSURE: 112 MMHG | TEMPERATURE: 97.3 F | OXYGEN SATURATION: 98 % | DIASTOLIC BLOOD PRESSURE: 75 MMHG | HEART RATE: 76 BPM

## 2020-07-08 PROCEDURE — 99213 OFFICE O/P EST LOW 20 MIN: CPT | Performed by: DERMATOLOGY

## 2020-07-08 PROCEDURE — G8417 CALC BMI ABV UP PARAM F/U: HCPCS | Performed by: DERMATOLOGY

## 2020-07-08 PROCEDURE — 1036F TOBACCO NON-USER: CPT | Performed by: DERMATOLOGY

## 2020-07-08 PROCEDURE — G8427 DOCREV CUR MEDS BY ELIG CLIN: HCPCS | Performed by: DERMATOLOGY

## 2020-07-08 RX ORDER — CLINDAMYCIN PHOSPHATE 10 UG/ML
LOTION TOPICAL
Qty: 60 ML | Refills: 11 | Status: SHIPPED | OUTPATIENT
Start: 2020-07-08 | End: 2022-03-08

## 2020-07-08 NOTE — PATIENT INSTRUCTIONS
-Consider Panoxyl Wash 10% lather it on and let it sit before you rinse it off.   -Follow up in one year.  -Shave with an electric razor.  -Continue with current acne regimen.

## 2020-07-08 NOTE — PROGRESS NOTES
Dermatology Patient Note  [facility] [facility]  [address]  [address] [facility] [address]  Dept: [phone]  Dept Fax: [phone]      VISITDATE: 7/8/2020   REFERRING PROVIDER: No ref. provider found      Lauris Osgood is a 23 y.o. male  who presents today in the office for:    Follow-up (3 month follow up on acne-pt is happy with results. He is still usin bpo, clinda and retin a. He is not always consistant when he is, it works well)      HISTORY OF PRESENT ILLNESS:  Patient with history of depression on lithium presents for f/u acne  Interval history: patient reports improvement and is happy with therapy. Face mostly clear but gets some areas of involvement on back. Not always consistent with therapy  Current treatment and adherence: BPO 5% wash, clindamycin lotion, tretinoin 0.025 cream  Prior treatments tried: see initial HPI      CURRENT MEDICATIONS:   Current Outpatient Medications   Medication Sig Dispense Refill    clindamycin (CLEOCIN T) 1 % lotion Apply to face, chest and back daily 60 mL 11    tretinoin (RETIN-A) 0.025 % cream Apply pea sized amount to forehead daily 45 g 11    benzoyl peroxide 5 % external liquid Wash face, chest and back 1-2 times daily 227 g 3    lithium 300 MG capsule Take 300 mg by mouth daily        No current facility-administered medications for this visit. ALLERGIES:   No Known Allergies    SOCIAL HISTORY:  Social History     Tobacco Use    Smoking status: Never Smoker    Smokeless tobacco: Never Used   Substance Use Topics    Alcohol use: No     Alcohol/week: 0.0 standard drinks       REVIEW OF SYSTEMS:  Review of Systems  Skin: Denies any new changing, growing orbleeding lesions or rashes except as described in the HPI   Constitutional: Denies fevers, chills, and malaise.     PHYSICAL EXAM:   /75 (Site: Right Upper Arm, Position: Sitting, Cuff Size: Large Adult)   Pulse 76   Temp 97.3 °F (36.3 °C)   Ht 5' 11\" (1.803 m) Wt 281 lb (127.5 kg)   SpO2 98%   BMI 39.19 kg/m²     General Exam:  General Appearance: No acute distress, Well nourished     Neuro: Alert and oriented to person, place and time  Psych: Normal affect   Lymph Node: Not performed    Cutaneous Exam: Performed as documented in clinic note below. Acne exam, which includes the head/face, neck, chest, and back was performed    Pertinent Physical Exam Findings:  Physical Exam  Acne mostly clear on face  Few acneiform papules on back    Photo surveillance performed: No    Medical Necessity of Exam Performed:   Distribution of patient concerns    Additional Diagnostic Testing performed during exam: Not performed ,  Not performed    ASSESSMENT:   Diagnosis Orders   1. Acne vulgaris  clindamycin (CLEOCIN T) 1 % lotion    tretinoin (RETIN-A) 0.025 % cream       Plan of Action is as Follows:  Assessment   1. Acne vulgaris, well controlled despite lithium use  - get OTC BPO wash at 10% strength, use as in shower mask then wash off after few minutes  - clindamycin (CLEOCIN T) 1 % lotion; Apply to face, chest and back daily  Dispense: 60 mL; Refill: 11  - tretinoin (RETIN-A) 0.025 % cream; Apply pea sized amount to forehead daily  Dispense: 45 g; Refill: 11    RTC 1 year            Patient Instructions   -Consider Panoxyl Wash 10% lather it on and let it sit before you rinse it off.   -Follow up in one year.  -Shave with an electric razor.  -Continue with current acne regimen. Follow-up: No follow-ups on file. This note was created with the assistance of a speech-recognition program.  Although the intention is to generate a document that actually reflects the content of the visit, no guarantees can be provided that every mistake has been identified and corrected byediting.     Electronically signed by Torie Darnell MD on 7/8/20 at 4:31 PM EDT

## 2020-09-24 ENCOUNTER — PATIENT MESSAGE (OUTPATIENT)
Dept: FAMILY MEDICINE CLINIC | Age: 20
End: 2020-09-24

## 2020-09-24 NOTE — TELEPHONE ENCOUNTER
From: Constance Eduardo  To: EUGENIO Frankel - CNP  Sent: 9/24/2020 1:21 PM EDT  Subject: Visit Follow-Up Question    My therapist should have sent something to you about a sleep study and possible chronic fatigue. Do I need to set up an appointment with you to get a sleep study scheduled? Thanks.

## 2020-12-25 ENCOUNTER — APPOINTMENT (OUTPATIENT)
Dept: GENERAL RADIOLOGY | Age: 20
End: 2020-12-25
Payer: COMMERCIAL

## 2020-12-25 ENCOUNTER — HOSPITAL ENCOUNTER (EMERGENCY)
Age: 20
Discharge: HOME | End: 2020-12-25
Attending: EMERGENCY MEDICINE
Payer: COMMERCIAL

## 2020-12-25 VITALS
BODY MASS INDEX: 39.77 KG/M2 | HEIGHT: 71 IN | OXYGEN SATURATION: 98 % | SYSTOLIC BLOOD PRESSURE: 154 MMHG | TEMPERATURE: 98.1 F | WEIGHT: 284.06 LBS | HEART RATE: 95 BPM | RESPIRATION RATE: 16 BRPM | DIASTOLIC BLOOD PRESSURE: 78 MMHG

## 2020-12-25 PROCEDURE — 93005 ELECTROCARDIOGRAM TRACING: CPT | Performed by: NURSE PRACTITIONER

## 2020-12-25 PROCEDURE — 71045 X-RAY EXAM CHEST 1 VIEW: CPT

## 2020-12-25 PROCEDURE — 99283 EMERGENCY DEPT VISIT LOW MDM: CPT

## 2020-12-25 RX ORDER — HYDROXYZINE HYDROCHLORIDE 25 MG/1
25 TABLET, FILM COATED ORAL 3 TIMES DAILY PRN
COMMUNITY
End: 2020-12-25 | Stop reason: ALTCHOICE

## 2020-12-25 RX ORDER — HYDROXYZINE 50 MG/1
50 TABLET, FILM COATED ORAL 3 TIMES DAILY PRN
Qty: 15 TABLET | Refills: 0 | Status: SHIPPED | OUTPATIENT
Start: 2020-12-25 | End: 2021-01-04

## 2020-12-25 RX ORDER — MULTIVIT-MIN/IRON/FOLIC ACID/K 18-600-40
2000 CAPSULE ORAL
COMMUNITY
End: 2021-10-21 | Stop reason: SDUPTHER

## 2020-12-25 RX ORDER — PAROXETINE 10 MG/1
10 TABLET, FILM COATED ORAL EVERY MORNING
COMMUNITY
End: 2021-04-21

## 2020-12-25 ASSESSMENT — PAIN DESCRIPTION - DESCRIPTORS: DESCRIPTORS: PRESSURE

## 2020-12-25 ASSESSMENT — ENCOUNTER SYMPTOMS
COLOR CHANGE: 0
NAUSEA: 0
VOMITING: 0
SINUS PRESSURE: 0
BACK PAIN: 0
COUGH: 0
SHORTNESS OF BREATH: 0

## 2020-12-25 ASSESSMENT — PAIN DESCRIPTION - LOCATION: LOCATION: CHEST

## 2020-12-25 ASSESSMENT — PAIN SCALES - GENERAL: PAINLEVEL_OUTOF10: 2

## 2020-12-25 NOTE — ED PROVIDER NOTES
eMERGENCY dEPARTMENT eNCOUnter   Independent Attestation     Pt Name: Gisela Alcantar  MRN: [id number]  [place] 2000  Date of evaluation: 12/25/20     Gisela Alcantar is a 23 y.o. male with CC: Chest Pain (patient concerned with elevated pressures at home and states irregular heart beat)      Based on the medical record the care appears appropriate. I was personally available for consultation in the Emergency Department.     EKG- sinus with rate 91  No ST changes  Nonspecific T wave changes  QTc 423  Nonspecific EKG    Roverto Stewart MD  Attending Emergency Physician                 Krystle Cordero MD  12/25/20 0639

## 2020-12-25 NOTE — ED PROVIDER NOTES
rate and regular rhythm. Pulmonary:      Effort: Pulmonary effort is normal.      Breath sounds: Normal breath sounds. Musculoskeletal: Normal range of motion. Right lower leg: No edema. Left lower leg: No edema. Skin:     General: Skin is warm and dry. Neurological:      General: No focal deficit present. Mental Status: He is alert and oriented to person, place, and time. Cranial Nerves: No cranial nerve deficit. Coordination: Coordination normal.   Psychiatric:         Mood and Affect: Mood normal.         Thought Content: Thought content normal.         DIAGNOSTIC RESULTS     RADIOLOGY:All plain film, CT, MRI, and formal ultrasound images (except ED bedside ultrasound) are read by the radiologist, see reports below, unless otherwise noted in MDM or here. Interpretation per the Radiologist below, if available at the time of this note:    XR CHEST PORTABLE   Final Result   No acute pulmonary process. LABS:  Labs Reviewed - No data to display    All other labs were within normal range or not returned as of this dictation. EMERGENCY DEPARTMENT COURSE and DIFFERENTIAL DIAGNOSIS/MDM:   Vitals:    Vitals:    20 1515   BP: (!) 154/78   Pulse: 95   Resp: 16   Temp: 98.1 °F (36.7 °C)   TempSrc: Oral   SpO2: 98%   Weight: 284 lb 0.9 oz (128.8 kg)   Height: 5' 11\" (1.803 m)       Medical Decision Makin-year-old male who is afebrile does not appear ill. He is in no distress. Chest x-ray and EKG are without any significant abnormalities. He had incidental finding of cardiomegaly. He did voice that the symptoms are similar to his anxiety attack a few months ago. He will be given a prescription for Atarax to see if that helps with his symptoms and extremity occur. He was also advised to follow-up with his primary care provider.              The patient was given the following meds in the ED:  Orders Placed This Encounter   Medications    hydrOXYzine (ATARAX) 50 MG tablet     Sig: Take 1 tablet by mouth 3 times daily as needed for Anxiety     Dispense:  15 tablet     Refill:  0       FINAL IMPRESSION      1.  Chest pain, unspecified type          DISPOSITION/PLAN   DISPOSITION Decision To Discharge 12/25/2020 04:19:29 PM      PATIENT REFERRED TO:   Judson Bernheim, APRN - CNP  [address]  [phone]            DISCHARGE MEDICATIONS:     New Prescriptions    HYDROXYZINE (ATARAX) 50 MG TABLET    Take 1 tablet by mouth 3 times daily as needed for Anxiety       CRITICAL CARE TIME       Please note that portions of this note were completed with a voice recognition program.      Paris BERMUDEZ [id number], EUGENIO Parham CNP  12/25/20 6026

## 2020-12-27 LAB
EKG ATRIAL RATE: 91 BPM
EKG P AXIS: 33 DEGREES
EKG P-R INTERVAL: 164 MS
EKG Q-T INTERVAL: 344 MS
EKG QRS DURATION: 92 MS
EKG QTC CALCULATION (BAZETT): 423 MS
EKG R AXIS: -12 DEGREES
EKG T AXIS: 9 DEGREES
EKG VENTRICULAR RATE: 91 BPM

## 2020-12-27 PROCEDURE — 93010 ELECTROCARDIOGRAM REPORT: CPT | Performed by: INTERNAL MEDICINE

## 2020-12-28 ENCOUNTER — TELEPHONE (OUTPATIENT)
Dept: FAMILY MEDICINE CLINIC | Age: 20
End: 2020-12-28

## 2020-12-28 NOTE — TELEPHONE ENCOUNTER
[facility] ([facility]) ED Follow up Call    Reason for ED visit:  Drake Tejeda , this is Ronpophenna Back from Dr. Roxana Freeman office, just calling to see how you are doing after your recent ED visit. Did you receive discharge instructions? Yes  Do you understand the discharge instructions? Yes  Did the ED give you any new prescriptions? Yes  Were you able to fill your prescriptions? No: ALREADY HAS RX THAT WAS GIVEN SO HE DIDN'T FILL YET      Do you have one of our red, yellow and green  Zone sheets that help you to determine when you should go to the ED? Not Applicable      Do you need or want to make a follow up appt with your PCP? No    Do you have any further needs in the home i.e. Equipment?   No        FU appts/Provider:    Future Appointments   Date Time Provider Emilia Fish   1/20/2021  1:10 PM EUGENIO Curtis - CNP fp sc [facility]   7/8/2021  1:30 PM Markos Blake MD  derm [facility]

## 2020-12-30 ENCOUNTER — OFFICE VISIT (OUTPATIENT)
Dept: FAMILY MEDICINE CLINIC | Age: 20
End: 2020-12-30
Payer: COMMERCIAL

## 2020-12-30 ENCOUNTER — HOSPITAL ENCOUNTER (OUTPATIENT)
Age: 20
Setting detail: SPECIMEN
Discharge: HOME | End: 2020-12-30
Payer: COMMERCIAL

## 2020-12-30 VITALS
BODY MASS INDEX: 39.76 KG/M2 | HEIGHT: 71 IN | HEART RATE: 77 BPM | TEMPERATURE: 97.2 F | OXYGEN SATURATION: 98 % | WEIGHT: 284 LBS

## 2020-12-30 PROCEDURE — 99213 OFFICE O/P EST LOW 20 MIN: CPT | Performed by: NURSE PRACTITIONER

## 2020-12-30 PROCEDURE — 1036F TOBACCO NON-USER: CPT | Performed by: NURSE PRACTITIONER

## 2020-12-30 PROCEDURE — G8484 FLU IMMUNIZE NO ADMIN: HCPCS | Performed by: NURSE PRACTITIONER

## 2020-12-30 PROCEDURE — G8427 DOCREV CUR MEDS BY ELIG CLIN: HCPCS | Performed by: NURSE PRACTITIONER

## 2020-12-30 PROCEDURE — G8417 CALC BMI ABV UP PARAM F/U: HCPCS | Performed by: NURSE PRACTITIONER

## 2020-12-30 RX ORDER — DOXYCYCLINE HYCLATE 100 MG/1
100 CAPSULE ORAL 2 TIMES DAILY
Qty: 20 CAPSULE | Refills: 0 | Status: SHIPPED | OUTPATIENT
Start: 2020-12-30 | End: 2021-01-09

## 2020-12-30 RX ORDER — PREDNISONE 20 MG/1
40 TABLET ORAL DAILY
Qty: 10 TABLET | Refills: 0 | Status: SHIPPED | OUTPATIENT
Start: 2020-12-30 | End: 2021-01-04

## 2020-12-30 ASSESSMENT — ENCOUNTER SYMPTOMS
WHEEZING: 0
EYE DISCHARGE: 0
SORE THROAT: 0
CHEST TIGHTNESS: 1
SINUS PRESSURE: 0
EYE REDNESS: 0
VOICE CHANGE: 0
SHORTNESS OF BREATH: 0
COUGH: 1

## 2020-12-30 NOTE — PROGRESS NOTES
[address] [facility]  [address] [facility] [address] [address]  Dept: [phone]  Dept Fax: [phone]     Joshua Martinez is a 23 y.o. male who presents to the urgent care today for his medicalconditions/complaints as noted below. Joshua Martinez is c/o of Covid Testing (lots of chest congestion and heavy chest feeling body aches #3 weeks)    HPI:      Cough  This is a new problem. Episode onset: 3 weeks ago. The problem has been gradually worsening. The cough is productive of sputum. Associated symptoms include myalgias. Pertinent negatives include no chest pain, chills, ear pain, eye redness, fever, headaches, nasal congestion, postnasal drip, rash, sore throat, shortness of breath or wheezing. He has tried nothing for the symptoms. The treatment provided no relief. Mother diagnosed with pneumonia, not sure if it is viral or bacterial.     Was seen in ED 12/25/2020 for chest pain. Had an EKG and CXR. Both were negative. Was given atarax for anxiety.     Past Medical History:   Diagnosis Date    Headache(784.0)     Obesity     Social anxiety disorder       Current Outpatient Medications   Medication Sig Dispense Refill    doxycycline hyclate (VIBRAMYCIN) 100 MG capsule Take 1 capsule by mouth 2 times daily for 10 days 20 capsule 0    predniSONE (DELTASONE) 20 MG tablet Take 2 tablets by mouth daily for 5 days 10 tablet 0    PARoxetine (PAXIL) 10 MG tablet Take 10 mg by mouth every morning      Cholecalciferol (VITAMIN D) 50 MCG (2000 UT) CAPS capsule Take 2,000 Units by mouth      hydrOXYzine (ATARAX) 50 MG tablet Take 1 tablet by mouth 3 times daily as needed for Anxiety 15 tablet 0    clindamycin (CLEOCIN T) 1 % lotion Apply to face, chest and back daily 60 mL 11    tretinoin (RETIN-A) 0.025 % cream Apply pea sized amount to forehead daily 45 g 11    benzoyl peroxide 5 % external liquid Wash face, chest and back 1-2 times daily 227 g 3  lithium 300 MG capsule Take 300 mg by mouth daily        No current facility-administered medications for this visit. No Known Allergies    Reviewed PMH, SH, and  with the patient and updated. Subjective:      Review of Systems   Constitutional: Negative for chills, fatigue and fever. HENT: Negative for congestion, ear discharge, ear pain, postnasal drip, sinus pressure, sneezing, sore throat and voice change. Eyes: Negative for discharge and redness. Respiratory: Positive for cough and chest tightness. Negative for shortness of breath and wheezing. Cardiovascular: Negative. Negative for chest pain. Musculoskeletal: Positive for myalgias. Skin: Negative for rash. Neurological: Negative for dizziness, weakness, light-headedness and headaches. Hematological: Negative for adenopathy. All other systems reviewed and are negative. Objective:      Physical Exam  Vitals signs and nursing note reviewed. Constitutional:       General: He is not in acute distress. Appearance: Normal appearance. He is well-developed. He is not ill-appearing, toxic-appearing or diaphoretic. HENT:      Head: Normocephalic. Right Ear: Tympanic membrane and external ear normal.      Left Ear: Tympanic membrane and external ear normal.      Nose: Nose normal.      Right Sinus: No maxillary sinus tenderness or frontal sinus tenderness. Left Sinus: No maxillary sinus tenderness or frontal sinus tenderness. Mouth/Throat:      Pharynx: No oropharyngeal exudate or posterior oropharyngeal erythema. Eyes:      General:         Right eye: No discharge. Left eye: No discharge. Cardiovascular:      Rate and Rhythm: Normal rate and regular rhythm. Heart sounds: Normal heart sounds. No murmur. Pulmonary:      Effort: Pulmonary effort is normal. No respiratory distress. Breath sounds: Normal breath sounds. No wheezing or rales.    Lymphadenopathy: Cervical: No cervical adenopathy. Skin:     General: Skin is warm. Findings: No rash. Neurological:      Mental Status: He is alert. Pulse 77   Temp 97.2 °F (36.2 °C) (Tympanic)   Ht 5' 11\" (1.803 m)   Wt 284 lb (128.8 kg)   SpO2 98%   BMI 39.61 kg/m²     XR completed in the office and reviewed. Assessment:       Diagnosis Orders   1. Cough  XR CHEST (2 VW)    doxycycline hyclate (VIBRAMYCIN) 100 MG capsule    predniSONE (DELTASONE) 20 MG tablet    COVID-19 Ambulatory     Plan:      CXR stable. Based on the duration and severity of the symptoms-- I will treat this as bacterial at this time. Patient instructed to complete antibiotic prescription fully. Prednisone sent to the pharmacy to help enable symptom relief. Will send out COVID19 testing. Possible treatment alterations based on the results. Patient instructed to self-quarantine until testing results are back. Patient instructed not to return to work until results are back. Tylenol as needed for fever/pain. Encouraged adequate hydration and rest.  The patient indicates understanding of these issues and agrees with the plan. Educational materials provided on AVS.  Follow up if symptoms do not improve/worsen. Discussed symptoms that will warrant urgent ED evaluation/treatment. Orders Placed This Encounter   Medications    doxycycline hyclate (VIBRAMYCIN) 100 MG capsule     Sig: Take 1 capsule by mouth 2 times daily for 10 days     Dispense:  20 capsule     Refill:  0    predniSONE (DELTASONE) 20 MG tablet     Sig: Take 2 tablets by mouth daily for 5 days     Dispense:  10 tablet     Refill:  0        Patient given educational materials - see patient instructions. Discussed use, benefit, and side effects of prescribed medications. All patientquestions answered. Pt voiced understanding.     Electronically signed by EUGENIO Galvan CNP on 12/30/2020at 12:04 PM

## 2020-12-30 NOTE — PATIENT INSTRUCTIONS

## 2020-12-31 DIAGNOSIS — R05.9 COUGH: ICD-10-CM

## 2021-01-02 LAB — SARS-COV-2, NAA: DETECTED

## 2021-01-20 ENCOUNTER — OFFICE VISIT (OUTPATIENT)
Dept: FAMILY MEDICINE CLINIC | Age: 21
End: 2021-01-20
Payer: COMMERCIAL

## 2021-01-20 VITALS
BODY MASS INDEX: 40.74 KG/M2 | WEIGHT: 291 LBS | HEIGHT: 71 IN | DIASTOLIC BLOOD PRESSURE: 80 MMHG | HEART RATE: 80 BPM | OXYGEN SATURATION: 98 % | SYSTOLIC BLOOD PRESSURE: 118 MMHG | TEMPERATURE: 97.3 F

## 2021-01-20 DIAGNOSIS — R53.83 FATIGUE, UNSPECIFIED TYPE: ICD-10-CM

## 2021-01-20 DIAGNOSIS — G47.9 RESTLESS SLEEPER: ICD-10-CM

## 2021-01-20 DIAGNOSIS — E78.2 MIXED HYPERLIPIDEMIA: ICD-10-CM

## 2021-01-20 DIAGNOSIS — Z11.59 ENCOUNTER FOR HEPATITIS C SCREENING TEST FOR LOW RISK PATIENT: ICD-10-CM

## 2021-01-20 DIAGNOSIS — Z13.1 SCREENING FOR DIABETES MELLITUS: ICD-10-CM

## 2021-01-20 DIAGNOSIS — R94.31 ABNORMAL EKG: ICD-10-CM

## 2021-01-20 DIAGNOSIS — F33.0 MILD EPISODE OF RECURRENT MAJOR DEPRESSIVE DISORDER (HCC): Primary | ICD-10-CM

## 2021-01-20 DIAGNOSIS — I51.7 LEFT VENTRICULAR HYPERTROPHY: ICD-10-CM

## 2021-01-20 PROCEDURE — 99214 OFFICE O/P EST MOD 30 MIN: CPT | Performed by: NURSE PRACTITIONER

## 2021-01-20 PROCEDURE — G8482 FLU IMMUNIZE ORDER/ADMIN: HCPCS | Performed by: NURSE PRACTITIONER

## 2021-01-20 PROCEDURE — 1036F TOBACCO NON-USER: CPT | Performed by: NURSE PRACTITIONER

## 2021-01-20 PROCEDURE — G8417 CALC BMI ABV UP PARAM F/U: HCPCS | Performed by: NURSE PRACTITIONER

## 2021-01-20 PROCEDURE — 90471 IMMUNIZATION ADMIN: CPT | Performed by: NURSE PRACTITIONER

## 2021-01-20 PROCEDURE — G8427 DOCREV CUR MEDS BY ELIG CLIN: HCPCS | Performed by: NURSE PRACTITIONER

## 2021-01-20 PROCEDURE — 90686 IIV4 VACC NO PRSV 0.5 ML IM: CPT | Performed by: NURSE PRACTITIONER

## 2021-01-20 RX ORDER — HYDROXYZINE HYDROCHLORIDE 25 MG/1
25 TABLET, FILM COATED ORAL 3 TIMES DAILY PRN
COMMUNITY
End: 2022-06-27

## 2021-01-20 ASSESSMENT — ENCOUNTER SYMPTOMS
SHORTNESS OF BREATH: 0
WHEEZING: 0
RESPIRATORY NEGATIVE: 1
COUGH: 0

## 2021-01-20 NOTE — PROGRESS NOTES
[address]  [facility]  [address] [facility] [address] [address]  Dept: [phone]  Dept Fax: [phone]      Spencer Greer is a 19 y.o. male who presents today for hismedical conditions/complaints as noted below. Spencer Greer is c/o of Other (DISCUSS SLEEP STUDY ) and Palpitations (was seen in the ER still on going )            HPI:      MALLORY  Bobo Vásquez is here today for several issues. He is seeing psych at [facility] and taking paxil and hydroxyzine. He had a panic attack 2 months ago and has been obsessed with thoughts of death since then. He denies any suicidal ideation, thoughts of harming others. He just cannot get death out of his mind. He is working with a counselor at [facility]. It has been suggested to him that he may need a sleep study. He denies snoring but is tossing and turning all night. He is fatigued, sleepy during day, headaches in the am. He also had an EKG that showed possible LVH. LVH-no htn, obesity, ?sleep apnea      Hemoglobin A1C (%)   Date Value   04/25/2018 4.9             ( goal A1Cis < 7)   No results found for: LABMICR  LDL Cholesterol (mg/dL)   Date Value   04/25/2018 128       (goal LDL is <100)   AST (U/L)   Date Value   04/25/2018 20     ALT (U/L)   Date Value   04/25/2018 25     BUN (mg/dL)   Date Value   04/25/2018 12     BP Readings from Last 3 Encounters:   01/20/21 118/80   12/25/20 (!) 154/78   07/08/20 112/75          (goal 120/80)    Past Medical History:   Diagnosis Date    Headache(784.0)     Obesity     Social anxiety disorder       No past surgical history on file.     Family History   Problem Relation Age of Onset    Depression Mother     High Blood Pressure Father     High Cholesterol Father     Heart Disease Father     Obesity Father     Other Father     Asthma Brother     Thyroid Disease Maternal Grandmother     Heart Disease Maternal Grandfather     Diabetes Maternal Grandfather 80   Temp 97.3 °F (36.3 °C)   Ht 5' 11\" (1.803 m)   Wt 291 lb (132 kg)   SpO2 98%   BMI 40.59 kg/m²   Physical Exam  Vitals signs reviewed. Constitutional:       Appearance: He is well-developed. He is obese. HENT:      Head: Normocephalic and atraumatic. Eyes:      Conjunctiva/sclera: Conjunctivae normal.   Neck:      Musculoskeletal: Normal range of motion. Cardiovascular:      Rate and Rhythm: Normal rate and regular rhythm. Heart sounds: Normal heart sounds. No murmur. Pulmonary:      Effort: Pulmonary effort is normal. No respiratory distress. Breath sounds: Normal breath sounds. Musculoskeletal: Normal range of motion. Skin:     General: Skin is warm and dry. Neurological:      General: No focal deficit present. Mental Status: He is alert and oriented to person, place, and time. Psychiatric:         Behavior: Behavior normal.         Thought Content: Thought content normal.         Judgment: Judgment normal.           Assessment:      1. Mild episode of recurrent major depressive disorder ([facility] [address]    2. Abnormal EKG    3. Left ventricular hypertrophy    4. Fatigue, unspecified type    5. Restless sleeper    6. Encounter for hepatitis C screening test for low risk patient    7. Mixed hyperlipidemia    8.  Screening for diabetes mellitus                     Plan:      Orders Placed This Encounter   Procedures    Hepatitis C Antibody     Standing Status:   Future     Standing Expiration Date:   1/20/2022    Lipid Panel     Standing Status:   Future     Standing Expiration Date:   1/20/2022     Order Specific Question:   Is Patient Fasting?/# of Hours     Answer:   yes    TSH     Standing Status:   Future     Standing Expiration Date:   1/20/2022    Basic Metabolic Panel     Standing Status:   Future     Standing Expiration Date:   1/20/2022    CBC     Standing Status:   Future     Standing Expiration Date:   1/20/2022    ECHO 2D WO Color Doppler Complete     Standing Status:   Future     Standing Expiration Date:   1/20/2022     Order Specific Question:   Reason for exam:     Answer:   left ventricular hypertrophy on EKG    Baseline Diagnostic Sleep Study     Standing Status:   Future     Standing Expiration Date:   1/20/2022     Order Specific Question:   Adult or Pediatric     Answer:   Adult Study (>7 Years)     Order Specific Question:   [facility] For [facility]     [facility] Specific Question:   Select Sleep Lab Location     Answer:   [address]     Order Specific Question:   Pre-Study Patient Questions: Answer: Tosses and Turns all night or describes their sleep as restless     Order Specific Question:   Pre-Study Patient Questions: Answer:   Reports multiple awakenings throughout the night     Order Specific Question:   Pre-Study Patient Questions: Answer:   Complains of morning headaches     Order Specific Question:   Pre-Study Patient Questions: Answer:   Complains of daytime sleepiness     1. Mild episode of recurrent major depressive disorder ([address]  Seeing psych at [facility] and counseling at [place] [address]    2. Abnormal EKG  Suggests Lvh, will get echo  - ECHO 2D WO Color Doppler Complete; Future    3. Left ventricular hypertrophy    - ECHO 2D WO Color Doppler Complete; Future    4. Fatigue, unspecified type    - Baseline Diagnostic Sleep Study; Future  - TSH; Future  - CBC; Future    5. Restless sleeper    - Baseline Diagnostic Sleep Study; Future    6. Encounter for hepatitis C screening test for low risk patient    - Hepatitis C Antibody; Future    7. Mixed hyperlipidemia    - Lipid Panel; Future    8. Screening for diabetes mellitus    - Basic Metabolic Panel; Future      No follow-ups on file. Patient given educational materials - see patientinstructions. Discussed use, benefit, and side effects of prescribed medications. All patient questions answered. Pt voiced understanding.  Reviewed health maintenance. Instructed to continue current medications, diet and exercise. Patient agreedwith treatment plan. Follow up as directed.      Electronically signed by EUGENIO Seals CNP on 1/20/2021

## 2021-01-20 NOTE — PROGRESS NOTES
Visit Information    Have you changed or started any medications since your last visit including any over-the-counter medicines, vitamins, or herbal medicines? no   Are you having any side effects from any of your medications? -  no  Have you stopped taking any of your medications? Is so, why? -  no    Have you seen any other physician or provider since your last visit? No  Have you had any other diagnostic tests since your last visit? Yes - Records Obtained  Have you been seen in the emergency room and/or had an admission to a hospital since we last saw you? Yes - Records Obtained  Have you had your routine dental cleaning in the past 6 months? no    Have you activated your [employer] account? If not, what are your barriers?  Yes     Patient Care Team:  EUGENIO Sharma CNP as PCP - General  EUGENIO Sharma CNP as PCP - Edith Shi Provider    Medical History Review  Past Medical, Family, and Social History reviewed and does contribute to the patient presenting condition    Health Maintenance   Topic Date Due    Hepatitis C screen  2000    Varicella vaccine (2 of 2 - 2-dose childhood series) 01/11/2002    HPV vaccine (1 - Male 2-dose series) 01/11/2009    Hepatitis A vaccine (2 of 2 - 2-dose series) 04/12/2013    Flu vaccine (1) 09/01/2020    DTaP/Tdap/Td vaccine (7 - Td) 09/15/2026    Hepatitis B vaccine  Completed    Hib vaccine  Completed    Meningococcal (ACWY) vaccine  Completed    HIV screen  Completed    Pneumococcal 0-64 years Vaccine  Aged Out

## 2021-01-26 ENCOUNTER — HOSPITAL ENCOUNTER (OUTPATIENT)
Age: 21
Setting detail: SPECIMEN
Discharge: HOME | End: 2021-01-26
Payer: COMMERCIAL

## 2021-01-26 DIAGNOSIS — R53.83 FATIGUE, UNSPECIFIED TYPE: ICD-10-CM

## 2021-01-26 DIAGNOSIS — Z13.1 SCREENING FOR DIABETES MELLITUS: ICD-10-CM

## 2021-01-26 DIAGNOSIS — R94.31 ABNORMAL EKG: ICD-10-CM

## 2021-01-26 DIAGNOSIS — E78.2 MIXED HYPERLIPIDEMIA: ICD-10-CM

## 2021-01-26 DIAGNOSIS — Z11.59 ENCOUNTER FOR HEPATITIS C SCREENING TEST FOR LOW RISK PATIENT: ICD-10-CM

## 2021-01-26 DIAGNOSIS — I51.7 LEFT VENTRICULAR HYPERTROPHY: ICD-10-CM

## 2021-01-26 LAB
ANION GAP SERPL CALCULATED.3IONS-SCNC: 13 MMOL/L (ref 9–17)
BUN BLDV-MCNC: 12 MG/DL (ref 6–20)
BUN/CREAT BLD: NORMAL (ref 9–20)
CALCIUM SERPL-MCNC: 9.2 MG/DL (ref 8.6–10.4)
CHLORIDE BLD-SCNC: 102 MMOL/L (ref 98–107)
CHOLESTEROL/HDL RATIO: 5
CHOLESTEROL: 212 MG/DL
CO2: 23 MMOL/L (ref 20–31)
CREAT SERPL-MCNC: 0.83 MG/DL (ref 0.7–1.2)
GFR AFRICAN AMERICAN: >60 ML/MIN
GFR NON-AFRICAN AMERICAN: >60 ML/MIN
GFR SERPL CREATININE-BSD FRML MDRD: NORMAL ML/MIN/{1.73_M2}
GFR SERPL CREATININE-BSD FRML MDRD: NORMAL ML/MIN/{1.73_M2}
GLUCOSE BLD-MCNC: 80 MG/DL (ref 70–99)
HCT VFR BLD CALC: 45.8 % (ref 40.7–50.3)
HDLC SERPL-MCNC: 42 MG/DL
HEMOGLOBIN: 15.3 G/DL (ref 13–17)
HEPATITIS C ANTIBODY: NONREACTIVE
LDL CHOLESTEROL: 148 MG/DL (ref 0–130)
MCH RBC QN AUTO: 28.4 PG (ref 25.2–33.5)
MCHC RBC AUTO-ENTMCNC: 33.4 G/DL (ref 28.4–34.8)
MCV RBC AUTO: 85 FL (ref 82.6–102.9)
NRBC AUTOMATED: 0 PER 100 WBC
PDW BLD-RTO: 12.8 % (ref 11.8–14.4)
PLATELET # BLD: 259 K/UL (ref 138–453)
PMV BLD AUTO: 10.8 FL (ref 8.1–13.5)
POTASSIUM SERPL-SCNC: 3.8 MMOL/L (ref 3.7–5.3)
RBC # BLD: 5.39 M/UL (ref 4.21–5.77)
SODIUM BLD-SCNC: 138 MMOL/L (ref 135–144)
TRIGL SERPL-MCNC: 108 MG/DL
TSH SERPL DL<=0.05 MIU/L-ACNC: 1.23 MIU/L (ref 0.3–5)
VLDLC SERPL CALC-MCNC: ABNORMAL MG/DL (ref 1–30)
WBC # BLD: 6.5 K/UL (ref 3.5–11.3)

## 2021-02-04 ENCOUNTER — HOSPITAL ENCOUNTER (OUTPATIENT)
Dept: NON INVASIVE DIAGNOSTICS | Age: 21
Discharge: HOME | End: 2021-02-04
Payer: COMMERCIAL

## 2021-02-04 LAB
LV EF: 65 %
LVEF MODALITY: NORMAL

## 2021-02-04 PROCEDURE — 93306 TTE W/DOPPLER COMPLETE: CPT

## 2021-02-11 ENCOUNTER — HOSPITAL ENCOUNTER (OUTPATIENT)
Dept: SLEEP CENTER | Age: 21
Discharge: HOME | End: 2021-02-13
Payer: COMMERCIAL

## 2021-02-11 VITALS — TEMPERATURE: 95 F

## 2021-02-11 DIAGNOSIS — R53.83 FATIGUE, UNSPECIFIED TYPE: ICD-10-CM

## 2021-02-11 DIAGNOSIS — G47.9 RESTLESS SLEEPER: ICD-10-CM

## 2021-02-11 PROCEDURE — 95810 POLYSOM 6/> YRS 4/> PARAM: CPT

## 2021-02-27 LAB — STATUS: NORMAL

## 2021-04-21 ENCOUNTER — OFFICE VISIT (OUTPATIENT)
Dept: FAMILY MEDICINE CLINIC | Age: 21
End: 2021-04-21
Payer: COMMERCIAL

## 2021-04-21 VITALS
SYSTOLIC BLOOD PRESSURE: 120 MMHG | HEART RATE: 78 BPM | TEMPERATURE: 98.1 F | OXYGEN SATURATION: 98 % | HEIGHT: 71 IN | DIASTOLIC BLOOD PRESSURE: 88 MMHG | BODY MASS INDEX: 40.46 KG/M2 | WEIGHT: 289 LBS

## 2021-04-21 DIAGNOSIS — R00.2 PALPITATION: Primary | ICD-10-CM

## 2021-04-21 DIAGNOSIS — F33.0 MILD EPISODE OF RECURRENT MAJOR DEPRESSIVE DISORDER (HCC): ICD-10-CM

## 2021-04-21 DIAGNOSIS — Z23 NEED FOR HPV VACCINATION: ICD-10-CM

## 2021-04-21 PROCEDURE — 99213 OFFICE O/P EST LOW 20 MIN: CPT | Performed by: NURSE PRACTITIONER

## 2021-04-21 PROCEDURE — 1036F TOBACCO NON-USER: CPT | Performed by: NURSE PRACTITIONER

## 2021-04-21 PROCEDURE — 90471 IMMUNIZATION ADMIN: CPT | Performed by: NURSE PRACTITIONER

## 2021-04-21 PROCEDURE — G8427 DOCREV CUR MEDS BY ELIG CLIN: HCPCS | Performed by: NURSE PRACTITIONER

## 2021-04-21 PROCEDURE — G8417 CALC BMI ABV UP PARAM F/U: HCPCS | Performed by: NURSE PRACTITIONER

## 2021-04-21 PROCEDURE — 90651 9VHPV VACCINE 2/3 DOSE IM: CPT | Performed by: NURSE PRACTITIONER

## 2021-04-21 RX ORDER — BUPROPION HYDROCHLORIDE 150 MG/1
TABLET ORAL
COMMUNITY
Start: 2021-03-31 | End: 2021-10-21 | Stop reason: ALTCHOICE

## 2021-04-21 RX ORDER — OXCARBAZEPINE 300 MG/1
TABLET, FILM COATED ORAL
COMMUNITY
Start: 2021-03-31 | End: 2021-10-21 | Stop reason: ALTCHOICE

## 2021-04-21 SDOH — ECONOMIC STABILITY: INCOME INSECURITY: HOW HARD IS IT FOR YOU TO PAY FOR THE VERY BASICS LIKE FOOD, HOUSING, MEDICAL CARE, AND HEATING?: NOT ASKED

## 2021-04-21 SDOH — ECONOMIC STABILITY: TRANSPORTATION INSECURITY
IN THE PAST 12 MONTHS, HAS THE LACK OF TRANSPORTATION KEPT YOU FROM MEDICAL APPOINTMENTS OR FROM GETTING MEDICATIONS?: NO

## 2021-04-21 ASSESSMENT — PATIENT HEALTH QUESTIONNAIRE - PHQ9
9. THOUGHTS THAT YOU WOULD BE BETTER OFF DEAD, OR OF HURTING YOURSELF: 0
SUM OF ALL RESPONSES TO PHQ9 QUESTIONS 1 & 2: 6
5. POOR APPETITE OR OVEREATING: 0
7. TROUBLE CONCENTRATING ON THINGS, SUCH AS READING THE NEWSPAPER OR WATCHING TELEVISION: 0
SUM OF ALL RESPONSES TO PHQ QUESTIONS 1-9: 6
8. MOVING OR SPEAKING SO SLOWLY THAT OTHER PEOPLE COULD HAVE NOTICED. OR THE OPPOSITE, BEING SO FIGETY OR RESTLESS THAT YOU HAVE BEEN MOVING AROUND A LOT MORE THAN USUAL: 0
2. FEELING DOWN, DEPRESSED OR HOPELESS: 3
SUM OF ALL RESPONSES TO PHQ QUESTIONS 1-9: 6
6. FEELING BAD ABOUT YOURSELF - OR THAT YOU ARE A FAILURE OR HAVE LET YOURSELF OR YOUR FAMILY DOWN: 0
3. TROUBLE FALLING OR STAYING ASLEEP: 0

## 2021-04-21 ASSESSMENT — ENCOUNTER SYMPTOMS
COUGH: 0
RESPIRATORY NEGATIVE: 1
WHEEZING: 0
SHORTNESS OF BREATH: 0

## 2021-04-21 NOTE — PROGRESS NOTES
[address]  Adam Arredondo [facility] 2.  [address] [address]  Dept: [phone]  Dept Fax: [phone]    Joselo Conrad is a 19 y.o. male who presents today for his medical conditions/complaintsas noted below. Joselo Conrad is c/o of   Chief Complaint   Patient presents with    Depression    Palpitations     wants to talk about echo - states that this is still happening          HPI:     HPI  Usman Mo is here today following up on testing. EKG suggested RVH-he did have echo and this found heart size and function to be normal. He is having some palpitations but he feels they are less. Depression-working with [employer]. He has stopped taking lexapro and is back on wellbutrin. He was having sexual side effects with the lexapro-decreased libido. He is concerned that this could be permanent. Energy level has been improved. Sleep study was completed and there was no sleep apnea found. Hemoglobin A1C (%)   Date Value   04/25/2018 4.9             ( goal A1Cis < 7)   No results found for: LABMICR  LDL Cholesterol (mg/dL)   Date Value   01/26/2021 148 (H)   04/25/2018 128       (goal LDL is <100)   AST (U/L)   Date Value   04/25/2018 20     ALT (U/L)   Date Value   04/25/2018 25     BUN (mg/dL)   Date Value   01/26/2021 12     BP Readings from Last 3 Encounters:   04/21/21 120/88   01/20/21 118/80   12/25/20 (!) 154/78          (goal 120/80)    Past Medical History:   Diagnosis Date    Headache(784.0)     Obesity     Social anxiety disorder       No past surgical history on file.     Family History   Problem Relation Age of Onset    Depression Mother     High Blood Pressure Father     High Cholesterol Father     Heart Disease Father     Obesity Father     Other Father     Asthma Brother     Thyroid Disease Maternal Grandmother     Heart Disease Maternal Grandfather     Diabetes Maternal Grandfather        Social History     Tobacco Use  Smoking status: Never Smoker    Smokeless tobacco: Never Used   Substance Use Topics    Alcohol use: No     Alcohol/week: 0.0 standard drinks      Current Outpatient Medications   Medication Sig Dispense Refill    buPROPion (WELLBUTRIN XL) 150 MG extended release tablet take 1 tablet by mouth every morning      OXcarbazepine (TRILEPTAL) 300 MG tablet take 1 tablet by mouth at bedtime      hydrOXYzine (ATARAX) 25 MG tablet Take 25 mg by mouth 3 times daily as needed for Itching      Cholecalciferol (VITAMIN D) 50 MCG (2000 UT) CAPS capsule Take 2,000 Units by mouth      clindamycin (CLEOCIN T) 1 % lotion Apply to face, chest and back daily 60 mL 11    tretinoin (RETIN-A) 0.025 % cream Apply pea sized amount to forehead daily 45 g 11    benzoyl peroxide 5 % external liquid Wash face, chest and back 1-2 times daily 227 g 3     No current facility-administered medications for this visit. No Known Allergies    Health Maintenance   Topic Date Due    Varicella vaccine (2 of 2 - 2-dose childhood series) 01/11/2002    COVID-19 Vaccine (1) Never done    Hepatitis A vaccine (2 of 2 - 2-dose series) 07/20/2021 (Originally 4/12/2013)    HPV vaccine (2 - Male 3-dose series) 05/19/2021    DTaP/Tdap/Td vaccine (7 - Td) 09/15/2026    Hepatitis B vaccine  Completed    Hib vaccine  Completed    Meningococcal (ACWY) vaccine  Completed    Flu vaccine  Completed    Hepatitis C screen  Completed    HIV screen  Completed    Pneumococcal 0-64 years Vaccine  Aged Out       Subjective:     Review of Systems   Constitutional: Positive for fatigue. Negative for diaphoresis and fever. HENT: Negative. Respiratory: Negative. Negative for cough, shortness of breath and wheezing. Cardiovascular: Negative. Negative for chest pain and palpitations. Musculoskeletal: Negative. Negative for arthralgias. Skin: Negative. Negative for pallor and rash. Neurological: Negative for syncope and headaches. Hematological: Negative. Psychiatric/Behavioral: Positive for dysphoric mood and sleep disturbance. The patient is nervous/anxious. Objective:     Physical Exam  Vitals signs reviewed. Constitutional:       Appearance: He is well-developed. He is obese. HENT:      Head: Normocephalic and atraumatic. Eyes:      Conjunctiva/sclera: Conjunctivae normal.   Neck:      Musculoskeletal: Normal range of motion. Cardiovascular:      Rate and Rhythm: Normal rate and regular rhythm. Heart sounds: Normal heart sounds. No murmur. Pulmonary:      Effort: Pulmonary effort is normal. No respiratory distress. Breath sounds: Normal breath sounds. Musculoskeletal: Normal range of motion. Skin:     General: Skin is warm and dry. Neurological:      General: No focal deficit present. Mental Status: He is alert and oriented to person, place, and time. Psychiatric:         Behavior: Behavior normal.         Thought Content: Thought content normal.         Judgment: Judgment normal.       /88 (Site: Left Upper Arm, Position: Sitting, Cuff Size: Large Adult)   Pulse 78   Temp 98.1 °F (36.7 °C) (Temporal)   Ht 5' 11\" (1.803 m)   Wt 289 lb (131.1 kg)   SpO2 98%   BMI 40.31 kg/m²     Assessment:       Diagnosis Orders   1. Palpitation     2. Mild episode of recurrent major depressive disorder ([facility] [address]     3. Need for HPV vaccination  HPV vaccine 9-valent IM (GARDASIL 9)             Plan:      Return in about 6 months (around 10/21/2021) for Nurse visit for HPV series at 2 mo and 6 mo. Orders Placed This Encounter   Procedures    HPV vaccine 9-valent IM (GARDASIL 9)   1. Palpitation  Feels they are lessening. Reviewed echo with Bob Aguilera is reassured his heart function is normal.     2. Mild episode of recurrent major depressive disorder ([address]  Continue to follow with [facility] and psych provider.      3. Need for HPV vaccination    - HPV vaccine 9-valent IM (GARDASIL 9)      No orders of the defined types were placed in this encounter. Patient given educational materials - see patient instructions. Discussed use, benefit, and side effects of prescribed medications. All patientquestions answered. Pt voiced understanding. Reviewed health maintenance. Instructedto continue current medications, diet and exercise. Patient agreed with treatmentplan. Follow up as directed.      Electronically signed by EUGENIO Knapp CNP on 4/21/2021 at 3:01 PM

## 2021-04-21 NOTE — PROGRESS NOTES
After obtaining consent, and per orders of Terese Fernández CNP, injection of hpv given in Left deltoid by Brittany Gunderson. Patient instructed to remain in clinic for 20 minutes afterwards, and to report any adverse reaction to me immediately. No reactions at this time.

## 2021-05-05 ENCOUNTER — HOSPITAL ENCOUNTER (OUTPATIENT)
Age: 21
Setting detail: SPECIMEN
Discharge: HOME | End: 2021-05-05
Payer: COMMERCIAL

## 2021-05-05 LAB
ANION GAP SERPL CALCULATED.3IONS-SCNC: 13 MMOL/L (ref 9–17)
BUN BLDV-MCNC: 11 MG/DL (ref 6–20)
BUN/CREAT BLD: NORMAL (ref 9–20)
CALCIUM SERPL-MCNC: 9.5 MG/DL (ref 8.6–10.4)
CHLORIDE BLD-SCNC: 101 MMOL/L (ref 98–107)
CO2: 24 MMOL/L (ref 20–31)
CREAT SERPL-MCNC: 0.86 MG/DL (ref 0.7–1.2)
GFR AFRICAN AMERICAN: >60 ML/MIN
GFR NON-AFRICAN AMERICAN: >60 ML/MIN
GFR SERPL CREATININE-BSD FRML MDRD: NORMAL ML/MIN/{1.73_M2}
GFR SERPL CREATININE-BSD FRML MDRD: NORMAL ML/MIN/{1.73_M2}
GLUCOSE BLD-MCNC: 90 MG/DL (ref 70–99)
POTASSIUM SERPL-SCNC: 4.2 MMOL/L (ref 3.7–5.3)
SODIUM BLD-SCNC: 138 MMOL/L (ref 135–144)
VITAMIN D 25-HYDROXY: 46.8 NG/ML (ref 30–100)

## 2021-06-21 ENCOUNTER — NURSE ONLY (OUTPATIENT)
Dept: FAMILY MEDICINE CLINIC | Age: 21
End: 2021-06-21
Payer: COMMERCIAL

## 2021-06-21 DIAGNOSIS — Z23 NEED FOR HPV VACCINE: Primary | ICD-10-CM

## 2021-06-21 PROCEDURE — 90651 9VHPV VACCINE 2/3 DOSE IM: CPT | Performed by: NURSE PRACTITIONER

## 2021-06-21 PROCEDURE — 90471 IMMUNIZATION ADMIN: CPT | Performed by: NURSE PRACTITIONER

## 2021-10-21 ENCOUNTER — OFFICE VISIT (OUTPATIENT)
Dept: FAMILY MEDICINE CLINIC | Age: 21
End: 2021-10-21
Payer: COMMERCIAL

## 2021-10-21 VITALS
HEART RATE: 93 BPM | DIASTOLIC BLOOD PRESSURE: 84 MMHG | TEMPERATURE: 98.1 F | WEIGHT: 272.4 LBS | HEIGHT: 71 IN | BODY MASS INDEX: 38.14 KG/M2 | OXYGEN SATURATION: 99 % | SYSTOLIC BLOOD PRESSURE: 118 MMHG

## 2021-10-21 DIAGNOSIS — F33.0 MILD EPISODE OF RECURRENT MAJOR DEPRESSIVE DISORDER (HCC): ICD-10-CM

## 2021-10-21 DIAGNOSIS — G43.009 MIGRAINE WITHOUT AURA AND WITHOUT STATUS MIGRAINOSUS, NOT INTRACTABLE: Primary | ICD-10-CM

## 2021-10-21 DIAGNOSIS — Z23 NEED FOR HPV VACCINATION: ICD-10-CM

## 2021-10-21 DIAGNOSIS — Z23 NEED FOR HEPATITIS A IMMUNIZATION: ICD-10-CM

## 2021-10-21 DIAGNOSIS — Z23 NEED FOR INFLUENZA VACCINATION: ICD-10-CM

## 2021-10-21 DIAGNOSIS — Z23 NEED FOR VARICELLA VACCINE: ICD-10-CM

## 2021-10-21 DIAGNOSIS — E78.2 MIXED HYPERLIPIDEMIA: ICD-10-CM

## 2021-10-21 DIAGNOSIS — D22.9 BENIGN SKIN MOLE: ICD-10-CM

## 2021-10-21 DIAGNOSIS — E66.01 CLASS 2 SEVERE OBESITY DUE TO EXCESS CALORIES WITH SERIOUS COMORBIDITY AND BODY MASS INDEX (BMI) OF 37.0 TO 37.9 IN ADULT (HCC): ICD-10-CM

## 2021-10-21 PROCEDURE — G8417 CALC BMI ABV UP PARAM F/U: HCPCS | Performed by: FAMILY MEDICINE

## 2021-10-21 PROCEDURE — 1036F TOBACCO NON-USER: CPT | Performed by: FAMILY MEDICINE

## 2021-10-21 PROCEDURE — G8484 FLU IMMUNIZE NO ADMIN: HCPCS | Performed by: FAMILY MEDICINE

## 2021-10-21 PROCEDURE — 90651 9VHPV VACCINE 2/3 DOSE IM: CPT | Performed by: FAMILY MEDICINE

## 2021-10-21 PROCEDURE — G8427 DOCREV CUR MEDS BY ELIG CLIN: HCPCS | Performed by: FAMILY MEDICINE

## 2021-10-21 PROCEDURE — 99213 OFFICE O/P EST LOW 20 MIN: CPT | Performed by: FAMILY MEDICINE

## 2021-10-21 PROCEDURE — 90472 IMMUNIZATION ADMIN EACH ADD: CPT | Performed by: FAMILY MEDICINE

## 2021-10-21 PROCEDURE — 90471 IMMUNIZATION ADMIN: CPT | Performed by: FAMILY MEDICINE

## 2021-10-21 PROCEDURE — 90716 VAR VACCINE LIVE SUBQ: CPT | Performed by: FAMILY MEDICINE

## 2021-10-21 PROCEDURE — 90632 HEPA VACCINE ADULT IM: CPT | Performed by: FAMILY MEDICINE

## 2021-10-21 RX ORDER — CHOLECALCIFEROL (VITAMIN D3) 125 MCG
CAPSULE ORAL
COMMUNITY
Start: 2021-09-16 | End: 2021-11-29 | Stop reason: SDUPTHER

## 2021-10-21 RX ORDER — PAROXETINE 10 MG/1
TABLET, FILM COATED ORAL
COMMUNITY
Start: 2021-08-20 | End: 2021-11-29

## 2021-10-21 ASSESSMENT — ENCOUNTER SYMPTOMS
COLOR CHANGE: 0
ABDOMINAL DISTENTION: 0
CHEST TIGHTNESS: 0
PHOTOPHOBIA: 0
SHORTNESS OF BREATH: 0
COUGH: 0

## 2021-10-21 ASSESSMENT — PATIENT HEALTH QUESTIONNAIRE - PHQ9
SUM OF ALL RESPONSES TO PHQ9 QUESTIONS 1 & 2: 0
SUM OF ALL RESPONSES TO PHQ QUESTIONS 1-9: 0
SUM OF ALL RESPONSES TO PHQ QUESTIONS 1-9: 0
2. FEELING DOWN, DEPRESSED OR HOPELESS: 0
1. LITTLE INTEREST OR PLEASURE IN DOING THINGS: 0
SUM OF ALL RESPONSES TO PHQ QUESTIONS 1-9: 0

## 2021-10-21 NOTE — PROGRESS NOTES
Visit Information    Have you changed or started any medications since your last visit including any over-the-counter medicines, vitamins, or herbal medicines? no   Have you stopped taking any of your medications? Is so, why? -  yes -   Are you having any side effects from any of your medications? - no    Have you seen any other physician or provider since your last visit? yes -    Have you had any other diagnostic tests since your last visit?  no   Have you been seen in the emergency room and/or had an admission in a hospital since we last saw you?  no   Have you had your routine dental cleaning in the past 6 months?  no     Do you have an active MyChart account? If no, what is the barrier?   Yes    Patient Care Team:  EUGENIO Dueñas CNP as PCP - General    Medical History Review  Past Medical, Family, and Social History reviewed and does contribute to the patient presenting condition    Health Maintenance   Topic Date Due    Varicella vaccine (2 of 2 - 2-dose childhood series) 01/11/2002    COVID-19 Vaccine (1) Never done    Hepatitis A vaccine (2 of 2 - 2-dose series) 04/12/2013    Flu vaccine (1) 09/01/2021    HPV vaccine (3 - Male 3-dose series) 10/21/2021    DTaP/Tdap/Td vaccine (7 - Td or Tdap) 09/15/2026    Hepatitis B vaccine  Completed    Hib vaccine  Completed    Meningococcal (ACWY) vaccine  Completed    Hepatitis C screen  Completed    HIV screen  Completed    Pneumococcal 0-64 years Vaccine  Aged Out

## 2021-10-21 NOTE — PROGRESS NOTES
Chief Complaint   Patient presents with    Other     CONCERNED MOLE ON RIGHT SIDE/UNSURE HOW LONG IT HAS BEEN THERE/NO PAIN    Immunizations     #3 HPV         Mando Ogden  here today for follow up on chronic medical problems, go over labs and/or diagnostic studies, and medication refills. Other (CONCERNED MOLE ON RIGHT SIDE/UNSURE HOW LONG IT HAS BEEN THERE/NO PAIN) and Immunizations (#3 HPV)      HPI: Patient is here for follow-up on migraine headaches and depression. Patient follows with psychiatrist for depression was taken off from Wellbutrin and Trileptal.  Patient is on Paxil reports he was having anxiety attacks and chest pain. Patient reports that medication is working. Patient follows with [place]. History of migraine headaches stable takes NSAIDs as needed. Patient is concerned about mole which is on the right side of the chest is not increased in size it is flat, no change in color. He has couple small moles also. Obesity getting worse, patient has been on a lot of psych meds. Patient had blood work done 1 year before that her hyperlipidemia. Patient is not on any medications. /84   Pulse 93   Temp 98.1 °F (36.7 °C)   Ht 5' 11\" (1.803 m)   Wt 272 lb 6.4 oz (123.6 kg)   SpO2 99%   BMI 37.99 kg/m²    Body mass index is 37.99 kg/m². Wt Readings from Last 3 Encounters:   10/21/21 272 lb 6.4 oz (123.6 kg)   04/21/21 289 lb (131.1 kg)   01/20/21 291 lb (132 kg)        [x]Negative depression screening. PHQ Scores 10/21/2021 4/21/2021 2/28/2019 9/11/2018 3/12/2018 8/2/2017   PHQ2 Score 0 6 2 2 2 2   PHQ9 Score 0 6 2 2 2 2      []1-4 = Minimal depression   []5-9 = Milddepression   []10-14 = Moderate depression   []15-19 = Moderately severe depression   []20-27 = Severe depression    Discussed testing with the patient and all questions fully answered.     Hospital Outpatient Visit on 05/05/2021   Component Date Value Ref Range Status    Glucose 05/05/2021 90  70 - 99 mg/dL Final    BUN 05/05/2021 11  6 - 20 mg/dL Final    CREATININE 05/05/2021 0.86  0.70 - 1.20 mg/dL Final    Bun/Cre Ratio 05/05/2021 NOT REPORTED  9 - 20 Final    Calcium 05/05/2021 9.5  8.6 - 10.4 mg/dL Final    Sodium 05/05/2021 138  135 - 144 mmol/L Final    Potassium 05/05/2021 4.2  3.7 - 5.3 mmol/L Final    Chloride 05/05/2021 101  98 - 107 mmol/L Final    CO2 05/05/2021 24  20 - 31 mmol/L Final    Anion Gap 05/05/2021 13  9 - 17 mmol/L Final    GFR Non- 05/05/2021 >60  >60 mL/min Final    GFR  05/05/2021 >60  >60 mL/min Final    GFR Comment 05/05/2021        Final    Comment: Average GFR for 20-28 years old:   116 mL/min/1.73sq m  Chronic Kidney Disease:   <60 mL/min/1.73sq m  Kidney failure:   <15 mL/min/1.73sq m              eGFR calculated using average adult body mass.  Additional eGFR calculator available at:        [email or web address]            GFR Staging 05/05/2021 NOT REPORTED   Final    Vit D, 25-Hydroxy 05/05/2021 46.8  30.0 - 100.0 ng/mL Final    Comment:    Reference Range:  Vitamin D status         Range   Deficiency              <20 ng/mL   Mild Deficiency       20-30 ng/mL   Sufficiency           ng/mL   Toxicity               >100 ng/mL           Most recent labs reviewed:     Lab Results   Component Value Date    WBC 6.5 01/26/2021    HGB 15.3 01/26/2021    HCT 45.8 01/26/2021    MCV 85.0 01/26/2021     01/26/2021       @BRIEFLAB(NA,K,CL,CO2,BUN,CREATININE,GLUCOSE,CALCIUM)@     Lab Results   Component Value Date    ALT 25 04/25/2018    AST 20 04/25/2018    ALKPHOS 74 04/25/2018    BILITOT 0.55 04/25/2018       Lab Results   Component Value Date    TSH 1.23 01/26/2021       Lab Results   Component Value Date    CHOL 212 (H) 01/26/2021    CHOL 194 04/25/2018     Lab Results   Component Value Date    TRIG 108 01/26/2021    TRIG 133 04/25/2018     Lab Results   Component Value Date    HDL 42 01/26/2021 HDL 39 (L) 04/25/2018     Lab Results   Component Value Date    LDLCHOLESTEROL 148 (H) 01/26/2021    LDLCHOLESTEROL 128 04/25/2018     Lab Results   Component Value Date    VLDL NOT REPORTED 01/26/2021    VLDL NOT REPORTED 04/25/2018     Lab Results   Component Value Date    CHOLHDLRATIO 5.0 (H) 01/26/2021    CHOLHDLRATIO 5.0 (H) 04/25/2018       Lab Results   Component Value Date    LABA1C 4.9 04/25/2018       No results found for: [id number]    No results found for: FOLATE    No results found for: IRON, TIBC, FERRITIN    Lab Results   Component Value Date    VITD25 46.8 05/05/2021             Current Outpatient Medications   Medication Sig Dispense Refill    Cholecalciferol (VITAMIN D3) 50 MCG (2000 UT) TABS take 1 tablet by mouth once daily      PARoxetine (PAXIL) 10 MG tablet take 1 tablet by mouth every morning      hydrOXYzine (ATARAX) 25 MG tablet Take 25 mg by mouth 3 times daily as needed for Itching      clindamycin (CLEOCIN T) 1 % lotion Apply to face, chest and back daily 60 mL 11    tretinoin (RETIN-A) 0.025 % cream Apply pea sized amount to forehead daily 45 g 11    benzoyl peroxide 5 % external liquid Wash face, chest and back 1-2 times daily 227 g 3     No current facility-administered medications for this visit. Social History     Socioeconomic History    Marital status: Single     Spouse name: Not on file    Number of children: Not on file    Years of education: Not on file    Highest education level: Not on file   Occupational History    Not on file   Tobacco Use    Smoking status: Never Smoker    Smokeless tobacco: Never Used   Substance and Sexual Activity    Alcohol use: No     Alcohol/week: 0.0 standard drinks    Drug use: Yes     Types:  Other-see comments     Comment: unsure mom stated possibly LSD    Sexual activity: Not on file   Other Topics Concern    Not on file   Social History Narrative    Not on file     Social Determinants of Health     Financial Resource Strain:     Difficulty of Paying Living Expenses:    Food Insecurity: No Food Insecurity    Worried About Running Out of Food in the Last Year: Never true    Jaden of Food in the Last Year: Never true   Transportation Needs: No Transportation Needs    Lack of Transportation (Medical): No    Lack of Transportation (Non-Medical): No   Physical Activity:     Days of Exercise per Week:     Minutes of Exercise per Session:    Stress:     Feeling of Stress :    Social Connections:     Frequency of Communication with Friends and Family:     Frequency of Social Gatherings with Friends and Family:     Attends Muslim Services:     Active Member of Clubs or Organizations:     Attends Club or Organization Meetings:     Marital Status:    Intimate Partner Violence:     Fear of Current or Ex-Partner:     Emotionally Abused:     Physically Abused:     Sexually Abused:      Counseling given: Not Answered        Family History   Problem Relation Age of Onset    Depression Mother     High Blood Pressure Father     High Cholesterol Father     Heart Disease Father     Obesity Father     Other Father     Asthma Brother     Thyroid Disease Maternal Grandmother     Heart Disease Maternal Grandfather     Diabetes Maternal Grandfather              -rest of complaints with corresponding details per ROS    The patient's past medical, surgical, social, and family history as well as his current medications and allergies were reviewed as documented intoday's encounter. Review of Systems   Constitutional: Positive for unexpected weight change. Negative for activity change, appetite change, fatigue and fever. HENT: Negative for congestion and nosebleeds. Eyes: Negative for photophobia. Respiratory: Negative for cough, chest tightness and shortness of breath. Cardiovascular: Negative for chest pain, palpitations and leg swelling. Gastrointestinal: Negative for abdominal distention. Endocrine: Negative for polyuria. Genitourinary: Negative for flank pain and frequency. Musculoskeletal: Negative for arthralgias and neck stiffness. Skin: Negative for color change, pallor and wound. Mole on right chest   Neurological: Negative for headaches. Psychiatric/Behavioral: Negative for agitation, decreased concentration and dysphoric mood. The patient is nervous/anxious. Physical Exam  Vitals and nursing note reviewed. Constitutional:       Appearance: Normal appearance. He is obese. HENT:      Head: Normocephalic. Nose: No congestion. Mouth/Throat:      Mouth: Mucous membranes are moist.   Cardiovascular:      Rate and Rhythm: Normal rate and regular rhythm. Heart sounds: Normal heart sounds. Pulmonary:      Effort: Pulmonary effort is normal.      Breath sounds: Normal breath sounds. No wheezing. Abdominal:      Palpations: Abdomen is soft. Musculoskeletal:         General: No swelling or deformity. Normal range of motion. Cervical back: Normal range of motion. Skin:            Comments: Small multiple moles on right side of the chest, benign in appearance. Neurological:      Mental Status: He is alert and oriented to person, place, and time. Cranial Nerves: Cranial nerves are intact. Psychiatric:         Attention and Perception: Attention normal.         Mood and Affect: Mood normal.         Behavior: Behavior normal.             ASSESSMENT AND PLAN      1. Migraine without aura and without status migrainosus, not intractable  Stable continue same medications    2. Class 2 severe obesity due to excess calories with serious comorbidity and body mass index (BMI) of 37.0 to 37.9 in [facility])  Worsening discussed with patient we can discuss in detail next appointment. Schedule physical    3. Benign skin mole  Benign mole continue to monitor    4. Mixed hyperlipidemia  Repeat lipid panel at next appointment    5.  Mild episode of recurrent major depressive disorder [facility])  Follow-up with psychiatrist and counselor    6. Need for influenza vaccination      7. Need for HPV vaccination    - HPV vaccine 9-valent IM (GARDASIL 9)    8. Need for varicella vaccine    - Varicella vaccine subcutaneous (VARIVAX)    9. Need for hepatitis A immunization    - Hep A Vaccine Adult (VAQTA)      Orders Placed This Encounter   Procedures    HPV vaccine 9-valent IM (GARDASIL 9)    Varicella vaccine subcutaneous (VARIVAX)    Hep A Vaccine Adult (VAQTA)         Medications Discontinued During This Encounter   Medication Reason    Cholecalciferol (VITAMIN D) 50 MCG (2000 UT) CAPS capsule DUPLICATE    buPROPion (WELLBUTRIN XL) 150 MG extended release tablet Alternate therapy    OXcarbazepine (TRILEPTAL) 300 MG tablet Therapy jassi Castle received counseling on the following healthy behaviors: nutrition, exercise and medication adherence  Reviewed prior labs and health maintenance  Continue current medications, diet and exercise. Discussed use, benefit, and side effects of prescribed medications. Barriers to medication compliance addressed. Patient given educational materials - see patient instructions  Was a self-tracking handout given in paper form or via "[employer]? Yes    Requested Prescriptions      No prescriptions requested or ordered in this encounter       All patient questions answered. Patient voiced understanding. Quality Measures    Body mass index is 37.99 kg/m². Elevated. Weight control planned discussed Healthy diet and regular exercise. BP: 118/84 Blood pressure is normal. Treatment plan consists of Weight Reduction, DASH Eating Plan, Dietary Sodium Restriction and No treatment change needed.     Lab Results   Component Value Date    LDLCHOLESTEROL 148 (H) 01/26/2021    (goal LDL reduction with dx if diabetes is 50% LDL reduction)      PHQ Scores 10/21/2021 4/21/2021 2/28/2019 9/11/2018 3/12/2018 8/2/2017   PHQ2 Score 0 6 2 2 2 2   PHQ9 Score 0 6 2 2 2 2     Interpretation of Total Score Depression Severity: 1-4 = Minimal depression, 5-9 = Mild depression, 10-14 = Moderate depression, 15-19 = Moderately severe depression, 20-27 = Severe depression    The patient'spast medical, surgical, social, and family history as well as his   current medications and allergies were reviewed as documented in today's encounter. Medications, labs, diagnostic studies, consultations andfollow-up as documented in this encounter. Return in about 3 months (around 1/21/2022) for annual exam 30min. Patient wasseen with total face to face time of 30 minutes. More than 50% of this visit was counseling and education. Future Appointments   Date Time Provider Emilia Polina   1/27/2022  2:45 PM [facility]     This note was completed by using the assistance of a speech-recognition program. However, inadvertent computerized transcription errors may be present. Althoughevery effort was made to ensure accuracy, no guarantees can be provided that every mistake has been identified and corrected by editing.   Electronically signed by Alex Velasquez MD on 10/21/2021  12:23 PM

## 2021-11-29 ENCOUNTER — HOSPITAL ENCOUNTER (OUTPATIENT)
Age: 21
Setting detail: SPECIMEN
Discharge: HOME | End: 2021-11-29

## 2021-11-29 ENCOUNTER — OFFICE VISIT (OUTPATIENT)
Dept: PRIMARY CARE CLINIC | Age: 21
End: 2021-11-29
Payer: COMMERCIAL

## 2021-11-29 VITALS
SYSTOLIC BLOOD PRESSURE: 113 MMHG | OXYGEN SATURATION: 97 % | HEART RATE: 71 BPM | TEMPERATURE: 97.2 F | DIASTOLIC BLOOD PRESSURE: 72 MMHG

## 2021-11-29 DIAGNOSIS — Z20.822 SUSPECTED COVID-19 VIRUS INFECTION: Primary | ICD-10-CM

## 2021-11-29 DIAGNOSIS — B34.9 VIRAL ILLNESS: ICD-10-CM

## 2021-11-29 PROCEDURE — G8427 DOCREV CUR MEDS BY ELIG CLIN: HCPCS | Performed by: FAMILY MEDICINE

## 2021-11-29 PROCEDURE — G8417 CALC BMI ABV UP PARAM F/U: HCPCS | Performed by: FAMILY MEDICINE

## 2021-11-29 PROCEDURE — G8484 FLU IMMUNIZE NO ADMIN: HCPCS | Performed by: FAMILY MEDICINE

## 2021-11-29 PROCEDURE — 1036F TOBACCO NON-USER: CPT | Performed by: FAMILY MEDICINE

## 2021-11-29 PROCEDURE — 99213 OFFICE O/P EST LOW 20 MIN: CPT | Performed by: FAMILY MEDICINE

## 2021-11-29 RX ORDER — GLUCOSAMINE/CHONDR SU A SOD 750-600 MG
TABLET ORAL
COMMUNITY
Start: 2021-11-12 | End: 2022-03-08 | Stop reason: ALTCHOICE

## 2021-11-29 RX ORDER — PAROXETINE HYDROCHLORIDE 20 MG/1
TABLET, FILM COATED ORAL
COMMUNITY
Start: 2021-11-12 | End: 2022-03-08

## 2021-11-29 ASSESSMENT — ENCOUNTER SYMPTOMS
NAUSEA: 0
SHORTNESS OF BREATH: 0
WHEEZING: 0
SORE THROAT: 0
COUGH: 1
DIARRHEA: 0
VOMITING: 0
RHINORRHEA: 1

## 2021-11-29 NOTE — PATIENT INSTRUCTIONS
Patient Education        Learning About Coronavirus [phone])  What is coronavirus (COVID-19)? COVID-19 is a disease caused by a type of coronavirus. This illness was first found in December 2019. It has since spread worldwide. Coronaviruses are a large group of viruses. They cause the common cold. They also cause more serious illnesses like [place] respiratory syndrome (MERS) and severe acute respiratory syndrome (SARS). COVID-19 is caused by a novel coronavirus. That means it's a new type that has not been seen in people before. What are the symptoms? COVID-19 symptoms may include:  · Fever. · Cough. · Trouble breathing. · Chills or repeated shaking with chills. · Muscle and body aches. · Headache. · Sore throat. · New loss of taste or smell. · Vomiting. · Diarrhea. In severe cases, COVID-19 can cause pneumonia and make it hard to breathe without help from a machine. It can cause death. How is it diagnosed? COVID-19 is diagnosed with a viral test. This may also be called a PCR test or antigen test. It looks for evidence of the virus in your breathing passages or lungs (respiratory system). The test is most often done on a sample from the nose, throat, or lungs. It's sometimes done on a sample of saliva. One way a sample is collected is by putting a long swab into the back of your nose. How is it treated? Mild cases of COVID-19 can be treated at home. Serious cases need treatment in the hospital. Treatment may include medicines to reduce symptoms, plus breathing support such as oxygen therapy or a ventilator. Some people may be placed on their belly to help their oxygen levels. Treatments that may help people who have COVID-19 include:  Antiviral medicines. These medicines treat viral infections. Remdesivir is an example. Immune-based therapy. These medicines help the immune system fight COVID-19. Examples include monoclonal antibodies. Blood thinners.    These medicines help prevent blood clots. People with severe illness are at risk for blood clots. How can you protect yourself and others? The best way to protect yourself from getting sick is to:  · Get vaccinated. · Avoid sick people. · If you are not fully vaccinated:  ? Wear a mask if you have to go to public areas. ? Avoid crowds and try to stay at least 6 feet away from other people. · Cover your mouth with a tissue when you cough or sneeze. · Wash your hands often, especially after you cough or sneeze. Use soap and water, and scrub for at least 20 seconds. If soap and water aren't available, use an alcohol-based hand . · Avoid touching your mouth, nose, and eyes. To help avoid spreading the virus to others:  · Get vaccinated. · Cover your mouth with a tissue when you cough or sneeze. · Wash your hands often, especially after you cough or sneeze. Use soap and water, and scrub for at least 20 seconds. If soap and water aren't available, use an alcohol-based hand . · If you have been exposed to the virus and are not fully vaccinated:  ? Stay home. Don't go to school, work, or public areas. And don't use public transportation, ride-shares, or taxis unless you have no choice. ? Wear a mask if you have to go to public areas, like the pharmacy. · If you're sick:  ? Leave your home only if you need to get medical care. But call the doctor's office first so they know you're coming. And wear a mask. ? Wear a mask whenever you're around other people. ? Limit contact with pets and people in your home. If possible, stay in a separate bedroom and use a separate bathroom. ? Clean and disinfect your home every day. Use household  and disinfectant wipes or sprays. Take special care to clean things that you touch with your hands. How can you self-isolate when you have COVID-19? If you have COVID-19, there are things you can do to help avoid spreading the virus to others.   · Limit contact with people in your home. If possible, stay in a separate bedroom and use a separate bathroom. · Wear a mask when you are around other people. · If you have to leave home, avoid crowds and try to stay at least 6 feet away from other people. · Avoid contact with pets and other animals. · Cover your mouth and nose with a tissue when you cough or sneeze. Then throw it in the trash right away. · Wash your hands often, especially after you cough or sneeze. Use soap and water, and scrub for at least 20 seconds. If soap and water aren't available, use an alcohol-based hand . · Don't share personal household items. These include bedding, towels, cups and glasses, and eating utensils. · [address] in the warmest water allowed for the fabric type, and dry it completely. It's okay to wash other people's laundry with yours. · Clean and disinfect your home. Use household  and disinfectant wipes or sprays. When should you call for help? Call 911 anytime you think you may need emergency care. For example, call if you have life-threatening symptoms, such as:    · You have severe trouble breathing. (You can't talk at all.)     · You have constant chest pain or pressure.     · You are severely dizzy or lightheaded.     · You are confused or can't think clearly.     · You have pale, gray, or blue-colored skin or lips.     · You pass out (lose consciousness) or are very hard to wake up. Call your doctor now or seek immediate medical care if:    · You have moderate trouble breathing. (You can't speak a full sentence.)     · You are coughing up blood (more than about 1 teaspoon).     · You have signs of low blood pressure. These include feeling lightheaded; being too weak to stand; and having cold, pale, clammy skin.    Watch closely for changes in your health, and be sure to contact your doctor if:    · Your symptoms get worse.     · You are not getting better as expected.     · You have new or worse symptoms of anxiety, depression, nightmares, or flashbacks. Call before you go to the doctor's office. Follow their instructions. And wear a mask. Current as of: July 1, 2021               Content Version: 13.0  © 2006-2021 [employer]. Care instructions adapted under license by [facility] ([facility]). If you have questions about a medical condition or this instruction, always ask your healthcare professional. [place] [address] any warranty or liability for your use of this information. We will send Covid swab for culture and call in results are available. Continue over-the-counter cough/cold medications as needed for symptoms.   If symptoms worsen or do not improve please follow-up with PCP or return to clinic

## 2021-11-29 NOTE — PROGRESS NOTES
[facility] [address] [facility]  [address] [facility]  [place] [address]  Dept: [phone]  Dept Fax: [phone]    Martina Roche is a 19 y.o. male who presents today for his medical conditions/complaintsas noted below. Martina Roche is c/o of Concern For COVID-19 (nasal congestion x Friday with cough, fatigue and low-grade fever)        HPI:     Cough  This is a new problem. The current episode started in the past 7 days (3 days). The problem has been unchanged. The problem occurs constantly. The cough is non-productive. Associated symptoms include a fever (unmeasured), headaches, nasal congestion and rhinorrhea. Pertinent negatives include no chills, ear pain, myalgias, rash, sore throat, shortness of breath or wheezing. Associated symptoms comments: Fatigue  . Nothing aggravates the symptoms. He has tried nothing for the symptoms. The treatment provided no relief. There is no history of asthma, COPD or environmental allergies. No significant past medical history  Mother is also sick-tested negative for COVID  Past Medical History:   Diagnosis Date    Headache(784.0)     Obesity     Social anxiety disorder     Past medical history reviewed and pertinent positives/negatives in the HPI    History reviewed. No pertinent surgical history.     Family History   Problem Relation Age of Onset    Depression Mother     High Blood Pressure Father     High Cholesterol Father     Heart Disease Father     Obesity Father     Other Father     Asthma Brother     Thyroid Disease Maternal Grandmother     Heart Disease Maternal Grandfather     Diabetes Maternal Grandfather        Social History     Tobacco Use    Smoking status: Never Smoker    Smokeless tobacco: Never Used   Substance Use Topics    Alcohol use: No     Alcohol/week: 0.0 standard drinks      Current Outpatient Medications   Medication Sig Dispense Refill    PARoxetine (PAXIL) 20 MG tablet take 1 tablet by mouth every morning      RA VITAMIN D-3 50 MCG (2000 UT) CAPS take 1 capsule by mouth once daily      hydrOXYzine (ATARAX) 25 MG tablet Take 25 mg by mouth 3 times daily as needed for Itching      clindamycin (CLEOCIN T) 1 % lotion Apply to face, chest and back daily 60 mL 11    tretinoin (RETIN-A) 0.025 % cream Apply pea sized amount to forehead daily 45 g 11    benzoyl peroxide 5 % external liquid Wash face, chest and back 1-2 times daily 227 g 3     No current facility-administered medications for this visit. No Known Allergies    Health Maintenance   Topic Date Due    COVID-19 Vaccine (1) Never done    Flu vaccine (1) 09/01/2021    DTaP/Tdap/Td vaccine (7 - Td or Tdap) 09/15/2026    Hepatitis A vaccine  Completed    Hepatitis B vaccine  Completed    Hib vaccine  Completed    HPV vaccine  Completed    Varicella vaccine  Completed    Meningococcal (ACWY) vaccine  Completed    Hepatitis C screen  Completed    HIV screen  Completed    Pneumococcal 0-64 years Vaccine  Aged Out       :      Review of Systems   Constitutional: Positive for fever (unmeasured). Negative for chills. HENT: Positive for congestion and rhinorrhea. Negative for ear pain and sore throat. Respiratory: Positive for cough. Negative for shortness of breath and wheezing. Gastrointestinal: Negative for diarrhea, nausea and vomiting. Musculoskeletal: Negative for myalgias. Skin: Negative for pallor and rash. Allergic/Immunologic: Negative for environmental allergies. Neurological: Positive for headaches. Hematological: Negative for adenopathy. Objective:     Physical Exam  Vitals and nursing note reviewed. Constitutional:       Appearance: Normal appearance. He is obese. HENT:      Head: Normocephalic and atraumatic. Right Ear: Hearing normal.      Left Ear: Hearing normal.      Nose: Nose normal.      Mouth/Throat:      Lips: Pink.       Mouth: Mucous membranes are moist.      Pharynx: Oropharynx is clear.   Eyes:      Extraocular Movements: Extraocular movements intact. Conjunctiva/sclera: Conjunctivae normal.   Cardiovascular:      Rate and Rhythm: Normal rate and regular rhythm. Heart sounds: Normal heart sounds. Pulmonary:      Effort: Pulmonary effort is normal.      Breath sounds: Normal breath sounds. Musculoskeletal:      Cervical back: Normal range of motion. No muscular tenderness. Skin:     General: Skin is warm and dry. Neurological:      Mental Status: He is alert and oriented to person, place, and time. Mental status is at baseline. Psychiatric:         Mood and Affect: Mood normal.         Behavior: Behavior normal.         Thought Content: Thought content normal.         Judgment: Judgment normal.       /72 (Site: Left Upper Arm, Position: Sitting, Cuff Size: Large Adult)   Pulse 71   Temp 97.2 °F (36.2 °C) (Temporal)   SpO2 97%     Assessment:       Diagnosis Orders   1. Suspected COVID-19 virus infection  COVID-19   2. Viral illness         Plan: We will send Covid swab for culture and call in results are available. Continue over-the-counter cough/cold medications as needed for symptoms. If symptoms worsen or do not improve please follow-up with PCP or return to clinic    Orders Placed This Encounter   Procedures    COVID-19     Standing Status:   Future     Standing Expiration Date:   11/29/2022     Scheduling Instructions:      1) Due to current limited availability of the COVID-19 test, tests will be prioritized based on responses to questions above. Testing may be delayed due to volume. 2) Print and instruct patient to adhere to [facility] home isolation program. (Link Above)              3) Set up or refer patient for a monitoring program.              4) Have patient sign up for and leverage [employer] (if not previously done). Order Specific Question:   Is this test for diagnosis or screening?      Answer:   Diagnosis of ill patient     Order Specific Question:   Symptomatic for COVID-19 as defined by CDC? Answer:   Yes     Order Specific Question:   Date of Symptom Onset     Answer:   11/26/2021     Order Specific Question:   Hospitalized for COVID-19? Answer:   No     Order Specific Question:   Admitted to ICU for COVID-19? Answer:   No     Order Specific Question:   Employed in healthcare setting? Answer:   No     Order Specific Question:   Resident in a congregate (group) care setting? Answer:   No     Order Specific Question:   Pregnant: Answer:   No     Order Specific Question:   Previously tested for COVID-19? Answer:   Yes     No orders of the defined types were placed in this encounter. Patient given educational materials - see patient instructions. Discussed use, benefit, and side effects of prescribed medications. All patient questions answered. Pt voiced understanding. Follow up as directed.      Electronicallysigned by Gay Nieves MD on 11/29/2021 at 7:00 PM

## 2021-11-29 NOTE — LETTER
[facility]  [address] [address]  Phone: [phone]  Fax: [phone]    Radha Daily MD        November 29, 2021     Patient: Rowena Caro   YOB: 2000   Date of Visit: 11/29/2021       To Whom it May Concern:    Rowena Caro was seen in my clinic and tested for COVID on 11/29/2021. He is to be excused from work while awaiting test results. If you have any questions or concerns, please don't hesitate to call.     Sincerely,         Radha Daily MD

## 2021-11-30 DIAGNOSIS — Z20.822 SUSPECTED COVID-19 VIRUS INFECTION: ICD-10-CM

## 2021-12-01 LAB
SARS-COV-2: NORMAL
SARS-COV-2: NOT DETECTED
SOURCE: NORMAL

## 2022-03-08 ENCOUNTER — TELEMEDICINE (OUTPATIENT)
Dept: FAMILY MEDICINE CLINIC | Age: 22
End: 2022-03-08
Payer: COMMERCIAL

## 2022-03-08 DIAGNOSIS — Z76.89 ESTABLISHING CARE WITH NEW DOCTOR, ENCOUNTER FOR: ICD-10-CM

## 2022-03-08 DIAGNOSIS — F33.0 MILD EPISODE OF RECURRENT MAJOR DEPRESSIVE DISORDER (HCC): Primary | ICD-10-CM

## 2022-03-08 DIAGNOSIS — F41.1 GAD (GENERALIZED ANXIETY DISORDER): ICD-10-CM

## 2022-03-08 PROBLEM — R41.840 CONCENTRATION DEFICIT: Status: RESOLVED | Noted: 2017-08-02 | Resolved: 2022-03-08

## 2022-03-08 PROBLEM — E78.2 MIXED HYPERLIPIDEMIA: Status: RESOLVED | Noted: 2021-10-21 | Resolved: 2022-03-08

## 2022-03-08 PROCEDURE — G8427 DOCREV CUR MEDS BY ELIG CLIN: HCPCS | Performed by: NURSE PRACTITIONER

## 2022-03-08 PROCEDURE — 99213 OFFICE O/P EST LOW 20 MIN: CPT | Performed by: NURSE PRACTITIONER

## 2022-03-08 RX ORDER — CHOLECALCIFEROL (VITAMIN D3) 125 MCG
CAPSULE ORAL
COMMUNITY
Start: 2022-02-23 | End: 2022-09-19 | Stop reason: SDUPTHER

## 2022-03-08 RX ORDER — OXCARBAZEPINE 300 MG/1
TABLET, FILM COATED ORAL
COMMUNITY
Start: 2022-01-29 | End: 2022-06-29 | Stop reason: SDUPTHER

## 2022-03-08 SDOH — HEALTH STABILITY: PHYSICAL HEALTH: ON AVERAGE, HOW MANY DAYS PER WEEK DO YOU ENGAGE IN MODERATE TO STRENUOUS EXERCISE (LIKE A BRISK WALK)?: 5 DAYS

## 2022-03-08 ASSESSMENT — SOCIAL DETERMINANTS OF HEALTH (SDOH)

## 2022-03-08 NOTE — PROGRESS NOTES
Curt Carbajal (:  2000) is a New patient, here for evaluation of the following:    Assessment & Plan   Below is the assessment and plan developed based on review of pertinent history, physical exam, labs, studies, and medications. 1. Mild episode of recurrent major depressive disorder ([address]  Continue with psych and continue same meds for now  Regular exercise advised for stress mgmt  2. Establishing care with new doctor, encounter for  3. GAIL (generalized anxiety disorder)  See above   UTD    Return in about 1 month (around 2022) for routine healthcare visit. Subjective   HPI   Pt. Calling in today to est. Care. Working mx shifts at [employer]. Single. No children. Has hx of anxiety and depression and continues with psych at [facility]. Stable on all meds and trying to find helpful solution for depression with psych MD at this time. Denies SI or HI. Has well exam isaak.  For         Review of Systems       Objective   Patient-Reported Vitals  No data recorded     Physical Exam  [INSTRUCTIONS:  \"[x]\" Indicates a positive item  \"[]\" Indicates a negative item  -- DELETE ALL ITEMS NOT EXAMINED]    Constitutional: [x] Appears well-developed and well-nourished [x] No apparent distress      [] Abnormal -     Mental status: [x] Alert and awake  [x] Oriented to person/place/time [x] Able to follow commands    [] Abnormal -     Eyes:   EOM    [x]  Normal    [] Abnormal -   Sclera  [x]  Normal    [] Abnormal -          Discharge [x]  None visible   [] Abnormal -     HENT: [x] Normocephalic, atraumatic  [] Abnormal -   [x] Mouth/Throat: Mucous membranes are moist    External Ears [x] Normal  [] Abnormal -    Neck: [x] No visualized mass [] Abnormal -     Pulmonary/Chest: [x] Respiratory effort normal   [x] No visualized signs of difficulty breathing or respiratory distress        [] Abnormal -      Musculoskeletal:   [x] Normal gait with no signs of ataxia         [x] Normal range of motion of neck        [] Abnormal -     Neurological:        [x] No Facial Asymmetry (Cranial nerve 7 motor function) (limited exam due to video visit)          [x] No gaze palsy        [] Abnormal -          Skin:        [x] No significant exanthematous lesions or discoloration noted on facial skin         [] Abnormal -            Psychiatric:       [x] Normal Affect [] Abnormal -        [x] No Hallucinations    Other pertinent observable physical exam findings:-          Brian Drew, was evaluated through a synchronous (real-time) audio-video encounter. The patient (or guardian if applicable) is aware that this is a billable service, which includes applicable co-pays. This Virtual Visit was conducted with patient's (and/or legal guardian's) consent. The visit was conducted pursuant to the emergency declaration under the [address] [place] [address] [facility] authority and the [employer] General Act. Patient identification was verified, and a caregiver was present when appropriate. The patient was located at home in a state where the provider was licensed to provide care.        --EUGENIO Rivas - CNP

## 2022-04-25 ENCOUNTER — OFFICE VISIT (OUTPATIENT)
Dept: FAMILY MEDICINE CLINIC | Age: 22
End: 2022-04-25
Payer: COMMERCIAL

## 2022-04-25 VITALS
DIASTOLIC BLOOD PRESSURE: 72 MMHG | WEIGHT: 259 LBS | SYSTOLIC BLOOD PRESSURE: 130 MMHG | HEART RATE: 90 BPM | BODY MASS INDEX: 36.26 KG/M2 | TEMPERATURE: 97.1 F | OXYGEN SATURATION: 96 % | HEIGHT: 71 IN

## 2022-04-25 DIAGNOSIS — E55.9 VITAMIN D DEFICIENCY: ICD-10-CM

## 2022-04-25 DIAGNOSIS — H61.23 BILATERAL IMPACTED CERUMEN: ICD-10-CM

## 2022-04-25 DIAGNOSIS — L70.0 ACNE VULGARIS: ICD-10-CM

## 2022-04-25 DIAGNOSIS — Z00.00 ENCOUNTER FOR WELL ADULT EXAM WITHOUT ABNORMAL FINDINGS: Primary | ICD-10-CM

## 2022-04-25 DIAGNOSIS — E78.2 MIXED HYPERLIPIDEMIA: ICD-10-CM

## 2022-04-25 PROCEDURE — 99395 PREV VISIT EST AGE 18-39: CPT | Performed by: NURSE PRACTITIONER

## 2022-04-25 PROCEDURE — 69209 REMOVE IMPACTED EAR WAX UNI: CPT | Performed by: NURSE PRACTITIONER

## 2022-04-25 RX ORDER — BUPROPION HYDROBROMIDE 174 MG/1
TABLET, EXTENDED RELEASE ORAL
COMMUNITY
Start: 2022-04-11 | End: 2022-06-27

## 2022-04-25 SDOH — ECONOMIC STABILITY: FOOD INSECURITY: WITHIN THE PAST 12 MONTHS, THE FOOD YOU BOUGHT JUST DIDN'T LAST AND YOU DIDN'T HAVE MONEY TO GET MORE.: NEVER TRUE

## 2022-04-25 SDOH — ECONOMIC STABILITY: FOOD INSECURITY: WITHIN THE PAST 12 MONTHS, YOU WORRIED THAT YOUR FOOD WOULD RUN OUT BEFORE YOU GOT MONEY TO BUY MORE.: NEVER TRUE

## 2022-04-25 ASSESSMENT — PATIENT HEALTH QUESTIONNAIRE - PHQ9
SUM OF ALL RESPONSES TO PHQ QUESTIONS 1-9: 2
6. FEELING BAD ABOUT YOURSELF - OR THAT YOU ARE A FAILURE OR HAVE LET YOURSELF OR YOUR FAMILY DOWN: 0
SUM OF ALL RESPONSES TO PHQ QUESTIONS 1-9: 2
1. LITTLE INTEREST OR PLEASURE IN DOING THINGS: 1
SUM OF ALL RESPONSES TO PHQ QUESTIONS 1-9: 2
SUM OF ALL RESPONSES TO PHQ QUESTIONS 1-9: 2
3. TROUBLE FALLING OR STAYING ASLEEP: 0
10. IF YOU CHECKED OFF ANY PROBLEMS, HOW DIFFICULT HAVE THESE PROBLEMS MADE IT FOR YOU TO DO YOUR WORK, TAKE CARE OF THINGS AT HOME, OR GET ALONG WITH OTHER PEOPLE: 0
5. POOR APPETITE OR OVEREATING: 0
9. THOUGHTS THAT YOU WOULD BE BETTER OFF DEAD, OR OF HURTING YOURSELF: 0
8. MOVING OR SPEAKING SO SLOWLY THAT OTHER PEOPLE COULD HAVE NOTICED. OR THE OPPOSITE, BEING SO FIGETY OR RESTLESS THAT YOU HAVE BEEN MOVING AROUND A LOT MORE THAN USUAL: 0
SUM OF ALL RESPONSES TO PHQ9 QUESTIONS 1 & 2: 2
7. TROUBLE CONCENTRATING ON THINGS, SUCH AS READING THE NEWSPAPER OR WATCHING TELEVISION: 0
2. FEELING DOWN, DEPRESSED OR HOPELESS: 1
4. FEELING TIRED OR HAVING LITTLE ENERGY: 0

## 2022-04-25 ASSESSMENT — ENCOUNTER SYMPTOMS
EYE PAIN: 0
DIARRHEA: 0
NAUSEA: 0
COLOR CHANGE: 0
ABDOMINAL PAIN: 0
CHEST TIGHTNESS: 0
COUGH: 0
CONSTIPATION: 0
SORE THROAT: 0
RHINORRHEA: 0
RECTAL PAIN: 0
SHORTNESS OF BREATH: 0

## 2022-04-25 ASSESSMENT — SOCIAL DETERMINANTS OF HEALTH (SDOH): HOW HARD IS IT FOR YOU TO PAY FOR THE VERY BASICS LIKE FOOD, HOUSING, MEDICAL CARE, AND HEATING?: NOT HARD AT ALL

## 2022-04-25 NOTE — PROGRESS NOTES
Well Adult Note  Name: Jaja Shall Date: 2022   MRN: [id number] Sex: Male   Age: 23 y.o. Ethnicity: Non- / Non    : 2000 Race: White (non-)      Ebony Vasquez is here for well adult exam.  History:  Doing well overall. Continues with psych. No changes to health or meds. Would like body wash for acne on back. Ears feel plugged also. Review of Systems   Constitutional: Negative for activity change, chills, fatigue, fever and unexpected weight change. HENT: Negative for congestion, ear pain, postnasal drip, rhinorrhea and sore throat. Eyes: Negative for pain and visual disturbance. Respiratory: Negative for cough, chest tightness and shortness of breath. Cardiovascular: Negative for chest pain, palpitations and leg swelling. Gastrointestinal: Negative for abdominal pain, constipation, diarrhea, nausea and rectal pain. Endocrine: Negative for polydipsia and polyuria. Genitourinary: Negative for difficulty urinating, dysuria, hematuria, penile pain, penile swelling, scrotal swelling and testicular pain. Musculoskeletal: Negative for arthralgias, myalgias and neck pain. Skin: Negative for color change. Neurological: Negative for dizziness, weakness, numbness and headaches. Psychiatric/Behavioral: Negative for dysphoric mood and sleep disturbance. The patient is not nervous/anxious. No Known Allergies      Prior to Visit Medications    Medication Sig Taking?  Authorizing Provider   APLENZIN 174 MG TB24 take 1 tablet by mouth IN THE MORNING Yes Historical Provider, MD   Benzoyl Peroxide 2.5 % LIQD Apply 1 applicator topically daily Yes Vernestine Sic, APRN - CNP   OXcarbazepine (TRILEPTAL) 300 MG tablet  Yes Historical Provider, MD   Cholecalciferol (VITAMIN D3) 50 MCG ( UT) TABS take 1 tablet by mouth once daily Yes Historical Provider, MD   hydrOXYzine (ATARAX) 25 MG tablet Take 25 mg by mouth 3 times daily as needed for Itching Yes [employer]         Past Medical History:   Diagnosis Date    Headache(784.0)     Moderate episode of recurrent major depressive disorder ([address]     Obesity     Panic attacks     Social anxiety disorder        History reviewed. No pertinent surgical history. Family History   Problem Relation Age of Onset    Depression Mother     High Blood Pressure Father     High Cholesterol Father     Heart Disease Father     Obesity Father     Other Father     Asthma Brother     Thyroid Disease Maternal Grandmother     Heart Disease Maternal Grandfather     Diabetes Maternal Grandfather        Social History     Tobacco Use    Smoking status: Never Smoker    Smokeless tobacco: Never Used   Vaping Use    Vaping Use: Never used   Substance Use Topics    Alcohol use: Yes     Alcohol/week: 2.0 standard drinks     Types: 2 Cans of beer per week    Drug use: Never       Objective   /72 (Site: Right Upper Arm, Position: Sitting, Cuff Size: Medium Adult)   Pulse 90   Temp 97.1 °F (36.2 °C) (Tympanic)   Ht 5' 11\" (1.803 m)   Wt 259 lb (117.5 kg)   SpO2 96%   BMI 36.12 kg/m²   Wt Readings from Last 3 Encounters:   04/25/22 259 lb (117.5 kg)   10/21/21 272 lb 6.4 oz (123.6 kg)   04/21/21 289 lb (131.1 kg)     There were no vitals filed for this visit. Physical Exam  Vitals and nursing note reviewed. Constitutional:       General: He is not in acute distress. Appearance: Normal appearance. He is well-developed. He is obese. He is not ill-appearing or diaphoretic. Comments: /72 (Site: Right Upper Arm, Position: Sitting, Cuff Size: Medium Adult)   Pulse 90   Temp 97.1 °F (36.2 °C) (Tympanic)   Ht 5' 11\" (1.803 m)   Wt 259 lb (117.5 kg)   SpO2 96%   BMI 36.12 kg/m²      HENT:      Head: Normocephalic and atraumatic. Right Ear: Hearing, tympanic membrane, ear canal and external ear normal. There is impacted cerumen.       Left Ear: Hearing, tympanic membrane, ear canal and external ear normal. There is impacted cerumen. Ears:      Comments: ears irrigated with warm water and hydrogen peroxide. Post irrigation, TM intact without redness or perforation. Pt tolerated well. Nose: Nose normal.      Mouth/Throat:      Pharynx: No oropharyngeal exudate. Eyes:      Conjunctiva/sclera: Conjunctivae normal.      Pupils: Pupils are equal, round, and reactive to light. Neck:      Thyroid: No thyromegaly. Vascular: No JVD. Cardiovascular:      Rate and Rhythm: Normal rate and regular rhythm. Heart sounds: Normal heart sounds. No murmur heard. Comments: NO LE edema  Pulmonary:      Effort: Pulmonary effort is normal. No respiratory distress. Breath sounds: Normal breath sounds. No wheezing. Abdominal:      General: Bowel sounds are normal.      Palpations: Abdomen is soft. Tenderness: There is no abdominal tenderness. Musculoskeletal:         General: Normal range of motion. Cervical back: Normal range of motion and neck supple. Lymphadenopathy:      Cervical: No cervical adenopathy. Skin:     General: Skin is warm and dry. Capillary Refill: Capillary refill takes less than 2 seconds. Findings: No rash. Comments: Mild acne to upper back   Neurological:      General: No focal deficit present. Mental Status: He is alert and oriented to person, place, and time. Mental status is at baseline. Motor: No abnormal muscle tone. Coordination: Coordination normal.   Psychiatric:         Mood and Affect: Mood normal.         Behavior: Behavior normal.         Thought Content: Thought content normal.         Judgment: Judgment normal.           Assessment   Plan   1.  Encounter for well adult exam without abnormal findings  Continue same meds and with psych  Patient advised to follow heart healthy, low fat  diet and 150 minutes of cardiovascular exercise per week   Dentist [id number]  Eye dr Arlyn Monaco  The nature of sun-induced photo-aging and skin cancers is discussed. Sun avoidance, protective clothing, and the use of 30-SPF sunscreens is advised. Observe closely for skin damage/changes, and call if such occurs. 2. Mixed hyperlipidemia  Recheck labs with psych meds and hx of high levels with lipids  -     Lipid Panel; Future  -     Basic Metabolic Panel; Future  3. Vitamin D deficiency  -     Vitamin D 25 Hydroxy; Future  4. Acne vulgaris  Trial liq. Wash  Call INB or worsening    -     Benzoyl Peroxide 2.5 % LIQD; Apply 1 applicator topically daily, Disp-227 g, R-0Normal  5.  Bilateral impacted cerumen  Continue with otc debrox for wax prevention    -     MS REMOVAL IMPACTED CERUMEN IRRIGATION/LVG UNILAT         Personalized Preventive Plan   Current Health Maintenance Status  Immunization History   Administered Date(s) Administered    DTaP 1998, 1998, 1998, 04/06/1999, 05/15/2003    DTaP vaccine 1998, 1998, 1998, 04/06/1999, 05/15/2003    HPV 9-valent Harish Rowels) 04/21/2021, 06/21/2021, 10/21/2021    Hepatitis A 10/12/2012    Hepatitis A Adult (Havrix, Vaqta) 10/21/2021    Hepatitis A Ped/Adol (Havrix, Vaqta) 10/12/2012    Hepatitis B 1998, 1998, 1998    Hepatitis B Ped/Adol (Engerix-B, Recombivax HB) 1998, 1998, 1998    Hib PRP-OMP (PedvaxHIB) 1998, 1998, 1998, 04/06/1999    Hib, unspecified 1998, 1998, 1998, 04/06/1999    Influenza A ([id number]) Vaccine PF IM 11/16/2009    Influenza Vaccine, unspecified formulation 10/12/2012    Influenza Virus Vaccine 10/12/2012, 09/15/2016, 02/12/2018, 09/11/2018, 09/12/2019    Influenza Whole 11/16/2009    Influenza, Quadv, IM, (6 mo and older Fluzone, Flulaval, Fluarix and 3 yrs and older Afluria) 02/12/2018, 09/11/2018, 09/12/2019    Influenza, Quadv, IM, PF (6 mo and older Fluzone, Flulaval, Fluarix, and 3 yrs and older Afluria) 09/15/2016, 01/20/2021    MMR 04/06/1999, 05/15/2003    Meningococcal ACWY Vaccine 10/12/2012    Meningococcal MCV4P (Menactra) 10/12/2012, 09/15/2016    Polio IPV (IPOL) 1998, 1998, 03/06/1999, 04/06/1999    Tdap (Boostrix, Adacel) 09/15/2016    Varicella (Varivax) 01/01/1999, 02/25/1999, 10/21/2021        Health Maintenance   Topic Date Due    COVID-19 Vaccine (1) Never done    Flu vaccine (Season Ended) 03/08/2023 (Originally 9/1/2022)    Depression Monitoring  10/21/2022    DTaP/Tdap/Td vaccine (7 - Td or Tdap) 09/15/2026    Hepatitis A vaccine  Completed    Hepatitis B vaccine  Completed    Hib vaccine  Completed    HPV vaccine  Completed    Varicella vaccine  Completed    Meningococcal (ACWY) vaccine  Completed    Hepatitis C screen  Completed    HIV screen  Completed    Pneumococcal 0-64 years Vaccine  Aged Out     Recommendations for [place] Due: see orders and patient instructions/AVS.    No follow-ups on file.

## 2022-04-25 NOTE — PROGRESS NOTES
Visit Information    Have you changed or started any medications since your last visit including any over-the-counter medicines, vitamins, or herbal medicines? no   Have you stopped taking any of your medications? Is so, why? -  no  Are you having any side effects from any of your medications? - no    Have you seen any other physician or provider since your last visit?  no   Have you had any other diagnostic tests since your last visit?  no   Have you been seen in the emergency room and/or had an admission in a hospital since we last saw you?  no   Have you had your routine dental cleaning in the past 6 months?  no     Do you have an active MyChart account? If no, what is the barrier?   yes    Patient Care Team:  EUGENIO Azul CNP as PCP - General (Family Nurse Practitioner)  EUGENIO Azul CNP as PCP - [facility] Provider    Medical History Review  Past Medical, Family, and Social History reviewed and  contribute to the patient presenting condition    Health Maintenance   Topic Date Due    COVID-19 Vaccine (1) Never done    Flu vaccine (Season Ended) 03/08/2023 (Originally 9/1/2022)    Depression Monitoring  10/21/2022    DTaP/Tdap/Td vaccine (7 - Td or Tdap) 09/15/2026    Hepatitis A vaccine  Completed    Hepatitis B vaccine  Completed    Hib vaccine  Completed    HPV vaccine  Completed    Varicella vaccine  Completed    Meningococcal (ACWY) vaccine  Completed    Hepatitis C screen  Completed    HIV screen  Completed    Pneumococcal 0-64 years Vaccine  Aged Out

## 2022-04-25 NOTE — PATIENT INSTRUCTIONS
Well Visit, Ages 25 to 48: Care Instructions  Overview     Well visits can help you stay healthy. Your doctor has checked your overall health and may have suggested ways to take good care of yourself. Your doctor also may have recommended tests. At home, you can help prevent illness withhealthy eating, regular exercise, and other steps. Follow-up care is a key part of your treatment and safety. Be sure to make and go to all appointments, and call your doctor if you are having problems. It's also a good idea to know your test results and keep alist of the medicines you take. How can you care for yourself at home?  Get screening tests that you and your doctor decide on. Screening helps find diseases before any symptoms appear.  Eat healthy foods. Choose fruits, vegetables, whole grains, protein, and low-fat dairy foods. Limit fat, especially saturated fat. Reduce salt in your diet.  Limit alcohol. If you are a man, have no more than 2 drinks a day or 14 drinks a week. If you are a woman, have no more than 1 drink a day or 7 drinks a week.  Get at least 30 minutes of physical activity on most days of the week. Walking is a good choice. You also may want to do other activities, such as running, swimming, cycling, or playing tennis or team sports. Discuss any changes in your exercise program with your doctor.  Reach and stay at a healthy weight. This will lower your risk for many problems, such as obesity, diabetes, heart disease, and high blood pressure.  Do not smoke or allow others to smoke around you. If you need help quitting, talk to your doctor about stop-smoking programs and medicines. These can increase your chances of quitting for good.  Care for your mental health. It is easy to get weighed down by worry and stress. Learn strategies to manage stress, like deep breathing and mindfulness, and stay connected with your family and community.  If you find you often feel sad or hopeless, talk with your doctor. Treatment can help.  Talk to your doctor about whether you have any risk factors for sexually transmitted infections (STIs). You can help prevent STIs if you wait to have sex with a new partner (or partners) until you've each been tested for STIs. It also helps if you use condoms (male or female condoms) and if you limit your sex partners to one person who only has sex with you. Vaccines are available for some STIs, such as HPV.  Use birth control if it's important to you to prevent pregnancy. Talk with your doctor about the choices available and what might be best for you.  If you think you may have a problem with alcohol or drug use, talk to your doctor. This includes prescription medicines (such as amphetamines and opioids) and illegal drugs (such as cocaine and methamphetamine). Your doctor can help you figure out what type of treatment is best for you.  Protect your skin from too much sun. When you're outdoors from 10 a.m. to 4 p.m., stay in the shade or cover up with clothing and a hat with a wide brim. Wear sunglasses that block UV rays. Even when it's cloudy, put broad-spectrum sunscreen (SPF 30 or higher) on any exposed skin.  See a dentist one or two times a year for checkups and to have your teeth cleaned.  Wear a seat belt in the car. When should you call for help? Watch closely for changes in your health, and be sure to contact your doctor if you have any problems or symptoms that concern you. Where can you learn more? Go to https://narinder.healthSwingPalpartners. org and sign in to your Kickboard account. Enter P072 in the [facility] box to learn more about \"Well Visit, Ages 25 to 48: Care Instructions. \"     If you do not have an account, please click on the \"Sign Up Now\" link. Current as of: October 6, 2021               Content Version: 13.2  © 0074-6199 Healthwise, Incorporated. Care instructions adapted under license by [facility] ([facility]).  If you have questions about a medical condition or this instruction, always ask your healthcare professional. [address] any warranty or liability for your use of this information. Learning About Changing a Habit by Setting Goals  How can you change a habit? If you've decided to change a habitwhether it's quitting smoking, lowering your blood pressure, becoming more active, or doing something else to improve your healthcongratulations! Making that decision is the first step towardmaking a change. What happens next? Have a reason. Set goals you can reach. Prepare forslip-ups. And get support. What's your reason? Your reason for wanting to change a habit is really important. Maybe you want to quit smoking so that you can avoid future health problems. Or maybe you want to eat a healthier diet so you can lose weight. If you have high blood pressure, your reason may be clear: to lower your blood pressure. Maybe yousmoke and want to save money on cigarettes. You need to feel ready to make a change. If you don't feel ready now, that's okay. You can still be thinking and planning. When you truly want to [facility], you're ready for the next step. It's not easy to change habitsbut you can do it. Taking the time to reallythink about what will motivate or inspire you will help you reach your goals. How do you set goals? Setting goals can help a lot when you're trying to make a healthy change.  Focus on small goals. This will help you reach larger goals over time. With smaller goals, you'll have success more often, which will help you stay with it. For example, your large goal may be to lose 20 pounds. Your small goal could be to lose 5.   Write down your goals. This will help you remember, and you'll have a clearer idea of what you want to achieve. Use a journal or notebook to record your goals.  Hang up your plan where you will see it often as a reminder of what you're trying to do.   Make your goals specific. Specific goals help you measure your progress. For example, setting a goal to eat one extra serving of vegetables a day is better than a general goal to \"eat more vegetables. \"   Focus on one goal at a time. By doing this, you're less likely to feel overwhelmed and then give up.  When you reach a goal, reward yourself. Celebrate your new behavior and success for several days, and then think about setting your next goal.  How can you prepare for slip-ups? It's perfectly normal to try to change a habit, go along fine for a while, and then have a setback. Lots of people try and try again before they reach theirgoals. What are the things that might cause a setback for you? If you have tried tochange a habit before, think about what helped you and what got in your way. By thinking about these barriers now, you can plan ahead for how to deal withthem if they happen. There will be times when you slip up and don't make your goal for the week. When that happens, don't get mad at yourself. Learn from the experience. Ask yourself what got in the way of reaching your goal. Positive thinking goes along way when you're making lifestyle changes. How can you get support?  Get a partner. It's motivating to know that someone is trying to make the same change that you're making, like being more active or changing your eating habits. You have someone who is counting on you to help them succeed. That person can also remind you how far you've come.  Get friends and family involved. They can exercise with you. Or they can encourage you by saying how they admire what you are doing. Family members can join you in your healthy eating efforts. Don't be afraid to tell family and friends that their encouragement makes a big difference to you.  Join a class or support group. People in these groups often have some of the same barriers you have.  They can give you support when you don't feel like staying with your plan. They can boost your morale when you need a lift. Maricruz Alvarado also find a number of online support groups.  Encourage yourself. When you feel like giving up, don't waste energy feeling bad about yourself. Remember your reason for wanting to change, think about the progress you've made, and give yourself a pep talk and a pat on the back.  Get professional help. A dietitian can help you make your diet healthier while still allowing you to eat foods that you enjoy. A  or physical therapist can help design an exercise program that is fun and easy to stay on. A counselor, a , or your doctor can help you overcome hurdles, reduce stress, or quit smoking. Where can you learn more? Go to https://Better Placebernardo.Optimus. org and sign in to your [email or web address] account. Enter G785 in the [employer] box to learn more about \"Learning About Changing a Habit by Setting Goals. \"     If you do not have an account, please click on the \"Sign Up Now\" link. Current as of: June 16, 2021               Content Version: 13.2  © 4029-0127 Healthwise, SMRxT. Care instructions adapted under license by [facility] ([facility]). If you have questions about a medical condition or this instruction, always ask your healthcare professional. [address] any warranty or liability for your use of this information. A Healthy Lifestyle: Care Instructions  Your Care Instructions     A healthy lifestyle can help you feel good, stay at a healthy weight, and have plenty of energy for both work and play. A healthy lifestyle is something youcan share with your whole family. A healthy lifestyle also can lower your risk for serious health problems, suchas high blood pressure, heart disease, and diabetes. You can follow a few steps listed below to improve your health and the healthof your family. Follow-up care is a key part of your treatment and safety.  Be sure to make and go If you have shortness of breath or asthma symptoms, they will likely get better within a few weeks after you quit.  Limit how much alcohol you drink. Moderate amounts of alcohol (up to 2 drinks a day for men, 1 drink a day for women) are okay. But drinking too much can lead to liver problems, high blood pressure, and other health problems. Family health  If you have a family, there are many things you can do together to improve yourhealth.  Eat meals together as a family as often as possible.  Eat healthy foods. This includes fruits, vegetables, lean meats and dairy, and whole grains.  Include your family in your fitness plan. Most people think of activities such as jogging or tennis as the way to fitness, but there are many ways you and your family can be more active. Anything that makes you breathe hard and gets your heart pumping is exercise. Here are some tips:  ? Walk to do errands or to take your child to school or the bus.  ? Go for a family bike ride after dinner instead of watching TV. Where can you learn more? Go to https://VaxartpeSeismotech.eSecure Systems. org and sign in to your allyve account. Enter U460 in the Snaps box to learn more about \"A Healthy Lifestyle: Care Instructions. \"     If you do not have an account, please click on the \"Sign Up Now\" link. Current as of: June 16, 2021               Content Version: 13.2  © 2006-2022 Envis. Care instructions adapted under license by [facility] ([facility]). If you have questions about a medical condition or this instruction, always ask your healthcare professional. [place] [address] any warranty or liability for your use of this information. Heart-Healthy Diet   Sodium, Fat, and Cholesterol Controlled Diet       What Is a Heart Healthy Diet? A heart-healthy diet is one that limits sodium , certain types of fat , and cholesterol .  This type of diet is recommended for:   · People with any form having a heart attack. You want this level to be high (ideally greater than 60). It is a risk to have a level less than 40. You can raise this good cholesterol by eating olive oil, canola oil, avocados, or nuts. Exercise raises this level, too. Fat    Fat is calorie dense and packs a lot of calories into a small amount of food. Even though fats should be limited due to their high calorie content, not all fats are bad. In fact, some fats are quite healthful. Fat can be broken down into four main types. · The good-for-you fats are:   ¨ Monounsaturated fat  found in oils such as olive and canola, avocados, and nuts and natural nut butters; can decrease cholesterol levels, while keeping levels of HDL cholesterol high   ¨ Polyunsaturated fat  found in oils such as safflower, sunflower, soybean, corn, and sesame; can decrease total cholesterol and LDL cholesterol   ¨ Omega-3 fatty acids  particularly those found in fatty fish (such as salmon, trout, tuna, mackerel, herring, and sardines); can decrease risk of arrhythmias, decrease triglyceride levels, and slightly lower blood pressure   · The fats that you want to limit are:   ¨ Saturated fat  found in animal products, many fast foods, and a few vegetables; increases total blood cholesterol, including LDL levels   § Animal fats that are saturated include: butter, lard, whole-milk dairy products, meat fat, and poultry skin   § Vegetable fats that are saturated include: hydrogenated shortening, palm oil, coconut oil, cocoa butter   ¨ Hydrogenated or trans fat  found in margarine and vegetable shortening, most shelf stable snack foods, and fried foods; increases LDL and decreases HDL     It is generally recommended that you limit your total fat for the day to less than 30% of your total calories. If you follow an 1800-calorie heart healthy diet, for example, this would mean 60 grams of fat or less per day.    Saturated fat and trans fat in your diet raises your blood cholesterol the most, much more than dietary cholesterol does. For this reason, on a heart-healthy diet, less than 7% of your calories should come from saturated fat and ideally 0% from trans fat. On an 1800-calorie diet, this translates into less than 14 grams of saturated fat per day, leaving 46 grams of fat to come from mono- and polyunsaturated fats.    Food Choices on a Heart Healthy Diet   Food Category   Foods Recommended   Foods to Avoid   Grains   Breads and rolls without salted tops Most dry and cooked cereals Unsalted crackers and breadsticks Low-sodium or homemade breadcrumbs or stuffing All rice and pastas   Breads, rolls, and crackers with salted tops High-fat baked goods (eg, muffins, donuts, pastries) Quick breads, self-rising flour, and biscuit mixes Regular bread crumbs Instant hot cereals Commercially prepared rice, pasta, or stuffing mixes   Vegetables   Most fresh, frozen, and low-sodium canned vegetables Low-sodium and salt-free vegetable juices Canned vegetables if unsalted or rinsed   Regular canned vegetables and juices, including sauerkraut and pickled vegetables Frozen vegetables with sauces Commercially prepared potato and vegetable mixes   Fruits   Most fresh, frozen, and canned fruits All fruit juices   Fruits processed with salt or sodium   Milk   Nonfat or low-fat (1%) milk Nonfat or low-fat yogurt Cottage cheese, low-fat ricotta, cheeses labeled as low-fat and low-sodium   Whole milk Reduced-fat (2%) milk Malted and chocolate milk Full fat yogurt Most cheeses (unless low-fat and low salt) Buttermilk (no more than 1 cup per week)   Meats and Beans   Lean cuts of fresh or frozen beef, veal, lamb, or pork (look for the word loin) Fresh or frozen poultry without the skin Fresh or frozen fish and some shellfish Egg whites and egg substitutes (Limit whole eggs to three per week) Tofu Nuts or seeds (unsalted, dry-roasted), low-sodium peanut butter Dried peas, beans, and lentils   Any smoked, cured, salted, or canned meat, fish, or poultry (including cordova, chipped beef, cold cuts, hot dogs, sausages, sardines, and anchovies) Poultry skins Breaded and/or fried fish or meats Canned peas, beans, and lentils Salted nuts   Fats and Oils   Olive oil and canola oil Low-sodium, low-fat salad dressings and mayonnaise   Butter, margarine, coconut and palm oils, cordova fat   Snacks, Sweets, and Condiments   Low-sodium or unsalted versions of broths, soups, soy sauce, and condiments Pepper, herbs, and spices; vinegar, lemon, or lime juice Low-fat frozen desserts (yogurt, sherbet, fruit bars) Sugar, cocoa powder, honey, syrup, jam, and preserves Low-fat, trans-fat free cookies, cakes, and pies Duc and animal crackers, fig bars, kaiden snaps   High-fat desserts Broth, soups, gravies, and sauces, made from instant mixes or other high-sodium ingredients Salted snack foods Canned olives Meat tenderizers, seasoning salt, and most flavored vinegars   Beverages   Low-sodium carbonated beverages Tea and coffee in moderation Soy milk   Commercially softened water   Suggestions   · Make whole grains, fruits, and vegetables the base of your diet. · Choose heart-healthy fats such as canola, olive, and flaxseed oil, and foods high in heart-healthy fats, such as nuts, seeds, soybeans, tofu, and fish. · Eat fish at least twice per week; the fish highest in omega-3 fatty acids and lowest in mercury include salmon, herring, mackerel, sardines, and canned chunk light tuna. If you eat fish less than twice per week or have high triglycerides, talk to your doctor about taking fish oil supplements. · Read food labels. ¨ For products low in fat and cholesterol, look for fat free, low-fat, cholesterol free, saturated fat free, and trans fat freeAlso scan the Nutrition Facts Label, which lists saturated fat, trans fat, and cholesterol amounts.    ¨ For products low in sodium, look for sodium free, very low sodium, low sodium, no added salt, and unsalted   · Skip the salt when cooking or at the table; if food needs more flavor, get creative and try out different herbs and spices. Garlic and onion also add substantial flavor to foods. · Trim any visible fat off meat and poultry before cooking, and drain the fat off after luevano. · Use cooking methods that require little or no added fat, such as grilling, boiling, baking, poaching, broiling, roasting, steaming, stir-frying, and sauting. · Avoid fast food and convenience food. They tend to be high in saturated and trans fat and have a lot of added salt. · Talk to a registered dietitian for individualized diet advice. Last Reviewed: March 2011 Kierra Castillo MS, MPH, RD   Updated: 3/29/2011     Patient information: Weight loss treatments    INTRODUCTION  Obesity is a major international problem, and Americans are among the heaviest people in the world. The percentage of obese people in the [facility] has risen steadily. Many people find that although they initially lose weight by dieting, they quickly regain the weight after the diet ends. Because it so hard to keep weight off over time, it is important to have as much information and support as possible before starting a diet. You are most likely to be successful in losing weight and keeping it off when you believe that your body weight can be controlled. STARTING A WEIGHT LOSS PROGRAM  Some people like to talk to their doctor or nurse to get help choosing the best plan, monitoring progress, and getting advice and support along the way. To know what treatment (or combination of treatments) will work best, determine your body mass index (BMI) and waist circumference (measurement). The BMI is calculated from your height and weight.   A person with a BMI between 25 and 29.9 is considered overweight   A person with a BMI of 30 or greater is considered to be obese  A waist circumference greater than 35 inches (88 cm) in women and 40 inches (102 cm) in men increases the risk of obesity-related complications, such as heart disease and diabetes. People who are obese and who have a larger waist size may need more aggressive weight loss treatment than others. Talk to your doctor or nurse for advice. Types of treatment  Based on your measurements and your medical history, your doctor or nurse can determine what combination of weight loss treatments would work best for you. Treatments may include changes in lifestyle, exercise, dieting, and, in some cases, weight loss medicines or weight loss surgery. Weight loss surgery, also called bariatric surgery, is reserved for people with severe obesity who have not responded to other weight loss treatments. SETTING A WEIGHT LOSS GOAL  It is important to set a realistic weight loss goal. Your first goal should be to avoid gaining more weight and staying at your current weight (or within 5 percent). Many people have a \"dream\" weight that is difficult or impossible to achieve. People at high risk of developing diabetes who are able to lose 5 percent of their body weight and maintain this weight will reduce their risk of developing diabetes by about 50 percent and reduce their blood pressure. This is a success. Losing more than 15 percent of your body weight and staying at this weight is an extremely good result, even if you never reach your \"dream\" or \"ideal\" weight. LIFESTYLE CHANGES  Programs that help you to change your lifestyle are usually run by psychologists or other professionals. The goals of lifestyle changes are to help you change your eating habits, become more active, and be more aware of how much you eat and exercise, helping you to make healthier choices. This type of treatment can be broken down into three steps:   The triggers that make you want to eat   Eating   What happens after you eat  Triggers to eat  Determining what triggers you to eat involves figuring out what foods you eat and where and when you eat. To figure out what triggers you to eat, keep a record for a few days of everything you eat, the places where you eat, how often you eat, and the emotions you were feeling when you ate. For some people, the trigger is related to a certain time of day or night. For others, the trigger is related to a certain place, like sitting at a desk working. Eating  You can change your eating habits by breaking the chain of events between the trigger for eating and eating itself. There are many ways to do this. For instance, you can:  Limit where you eat to a few places (eg, dining room)   Restrict the number of utensils (eg, only a fork) used for eating   Drink a sip of water between each bite   Chew your food a certain number of times   Get up and stop eating every few minutes  What happens after you eat  Rewarding yourself for good eating behaviors can help you to develop better habits. This is not a reward for weight loss; instead, it is a reward for changing unhealthy behaviors. Do not use food as a reward. Some people find money, clothing, or personal care (eg, a hair cut, manicure, or massage) to be effective rewards. Treat yourself immediately after making better eating choices to reinforce the value of the good behavior. You need to have clear behavior goals, and you must have a time frame for reaching your goals. Reward small changes along the way to your final goal.  Other factors that contribute to successful weight loss  Changing your behavior involves more than just changing unhealthy eating habits; it also involves finding people around you to support your weight loss, reducing stress, and learning to be strong when tempted by food. Establish a \"dorcas\" system  Having a friend or family member available to provide support and reinforce good behavior is very helpful. The support person needs to understand your goals.    Learn to be strong  Learning to be strong when tempted by food is an important part of losing weight. As an example, you will need to learn how to say \"no\" and continue to say no when urged to eat at parties and social gatherings. Develop strategies for events before you go, such as eating before you go or taking low-calorie snacks and drinks with you. Develop a support system  Having a support system is helpful when losing weight. This is why many commercial groups are successful. Family support is also essential; if your family does not support your efforts to lose weight, this can slow your progress or even keep you from losing weight. Positive thinking  People often have conversations with themselves in their head; these conversations can be positive or negative. If you eat a piece of cake that was not planned, you may respond by thinking, \"Oh, you stupid idiot, you've blown your diet! \" and as a result, you may eat more cake. A positive thought for the same event could be, \"Well, I ate cake when it was not on my plan. Now I should do something to get back on track. \" A positive approach is much more likely to be successful than a negative one. Reduce stress  Although stress is a part of everyday life, it can trigger uncontrolled eating in some people. It is important to find a way to get through these difficult times without eating or by eating low-calorie food, like raw vegetables. It may be helpful to imagine a relaxing place that allows you to temporarily escape from stress. With deep breaths and closed eyes, you can imagine this relaxing place for a few minutes. Self-help programs  Self-help programs like [employer] Watchers®, Overeaters Anonymous®, and Take Off [place] (TOPS)© work for some people. As with all weight loss programs, you are most likely to be successful with these plans if you make long-term changes in how you eat. CHOOSING A DIET  A calorie is a unit of energy found in food. Your body needs calories to function.  The goal of any diet is to burn up more calories than you eat. How quickly you lose weight depends upon several factors, such as your age, gender, and starting weight. Older people have a slower metabolism than young people, so they lose weight more slowly. Men lose more weight than women of similar height and weight when dieting because they use more energy. People who are extremely overweight lose weight more quickly than those who are only mildly overweight. Try not to drink alcohol or drinks with added sugar, and most sweets (candy, cakes, cookies), since they rarely contain important nutrients. Portion-controlled diets  One simple way to diet is to buy packaged foods, like frozen low-calorie meals or meal-replacement canned drinks. A typical meal plan for one day may include:  A meal-replacement drink or breakfast bar for breakfast   A meal-replacement drink or a frozen low-calorie (250 to 350 calories) meal for lunch   A frozen low-calorie meal or other prepackaged, calorie-controlled meal, along with extra vegetables for dinner  This would give you 1000 to 1500 calories per day. Low-fat diet  To reduce the amount of fat in your diet, you can:  Eat low-fat foods. Low-fat foods are those that contain less than 30 percent of calories from fat. Fat is listed on the food facts label (figure 1). Count fat grams. For a 1500 calorie diet, this would mean about 45 g or fewer of fat per day. Low-carbohydrate diet  Low- and very-low-carbohydrate diets (eg, Atkins diet, [employer]) have become popular ways to lose weight quickly.   With a very-low-carbohydrate diet, you eat between 0 and 60 grams of carbohydrates per day (a standard diet contains 200 to 300 grams of carbohydrates)   With a low-carbohydrate diet, you eat between 60 and 130 grams of carbohydrates per day  Carbohydrates are found in fruits, vegetables, and grains (including breads, rice, pasta, and cereal), alcoholic beverages, and in dairy products. Meat and fish do not contain carbohydrates. Side effects of very-low-carbohydrate diets can include constipation, headache, bad breath, muscle cramps, diarrhea, and weakness. Mediterranean diet  The term \"Mediterranean diet\" refers to a way of eating that is common in [place] regions around the [facility]. Although there is some variation in Mediterranean diets, there are some similarities. Most Mediterranean diets include:  A high level of monounsaturated fats (from olive or canola oil, walnuts, pecans, almonds) and a low level of saturated fats (from butter)   A high amount of vegetables, fruits, legumes, and grains (7 to 10 servings of fruits and vegetables per day)   A moderate amount of milk and dairy products, mostly in the form of cheese. Use low-fat dairy products (skim milk, fat-free yogurt, low-fat cheese). A relatively low amount of red meat and meat products. Substitute fish or poultry for red meat. For those who drink alcohol, a modest amount (mainly as red wine) may help to protect against cardiovascular disease. A modest amount is up to one (4 ounce) glass per day for women and up to two glasses per day for men. Which diet is best?  Studies have compared different diets, including:  Very-low-carbohydrate (Atkins)   Macronutrient balance controlling glycemic load (Zone®)   Reduced-calorie (Weight Watchers®)   Very-low-fat (Ornish)  No one diet is \"best\" for weight loss. Any diet will help you to lose weight if you stick with the diet. Therefore, it is important to choose a diet that includes foods you like. Fad diets  Fad diets often promise quick weight loss (more than 1 to 2 pounds per week) and may claim that you do not need to exercise or give up favorite foods. Some fad diets cost a lot of money, because you have to pay for seminars or pills.  Fad diets generally lack any scientific evidence that they are safe and effective, but instead rely on \"before\" and \"after\" photos or testimonials. Diets that sound too good to be true usually are. These plans are a waste of time and money and are not recommended. A doctor, nurse, or nutritionist can help you find a safe and effective way to lose weight and keep it off. WEIGHT LOSS [place] a weight loss medicine may be helpful when used in combination with diet, exercise, and lifestyle changes. However, it is important to understand the risks and benefits of these medicines. It is also important to be realistic about your goal weight using a weight loss medicine; you may not reach your \"dream\" weight, but you may be able to reduce your risk of diabetes or heart disease. Weight loss medicines may be recommended for people who have not been able to lose weight with diet and exercise who have a:  BMI of 30 or more    BMI between 27 and 29.9 and have other medical problems, such as diabetes, high cholesterol, or high blood pressure  Two weight loss medicines are approved in the [place] for long-term use. These are sibutramine and orlistat. Other weight loss medicines (phentermine, diethylpropion) are available but are only approved for short-term use (up to 12 weeks). Sibutramine  Sibutramine (Meridia®, Reductil®) is a medicine that reduces your appetite. In people who take the medicine for one year, the average weight loss is 10 percent of the initial body weight (5 percent more than those who took a placebo treatment). Side effects of sibutramine include insomnia, dry mouth, and constipation. Increases in blood pressure can occur. Therefore, blood pressure is usually monitored during treatment. There is no evidence that sibutramine causes heart or lung problems (like dexfenfluramine and fenfluramine (Phen/Fen)).  However, experts agree that sibutramine should not used by people with coronary heart disease, heart failure, uncontrolled hypertension, stroke, irregular heart rhythms, or peripheral vascular disease (poor circulation in the legs). Orlistat  Orlistat (Xenical® 120 mg capsules) is a medicine that reduces the amount of fat your body absorbs from the foods you eat. A lower-dose version is now available without a prescription (Kb® 60 mg capsules) in many countries, including the [place]. The medicine is recommended three times per day, taken with a meal; you can skip a dose if you skip a meal or if the meal contains no fat. After one year of treatment with orlistat, the average weight loss is approximately 8 to 10 percent of initial body weight (4 percent more than in those who took a placebo). Cholesterol levels often improve, and blood pressure sometimes falls. In people with diabetes, orlistat may help control blood sugar levels. Side effects occur in 15 to 10 percent of people and may include stomach cramps, gas, diarrhea, leakage of stool, or oily stools. These problems are more likely when you take orlistat with a high-fat meal (if more than 30 percent of calories in the meal are from fat). Side effects usually improve as you learn to avoid high-fat foods. Severe liver injury has been reported rarely in patients taking orlistat, but it is not known if orlistat caused the liver problems. Diet supplements  Diet supplements are widely used by people who are trying to lose weight, although the safety and efficacy of these supplements are often unproven. A few of the more common diet supplements are discussed below; none of these are recommended because they have not been studied carefully, and there is no proof they are safe or effective. Chitosan and wheat dextrin are ineffective for weight loss, and their use is not recommended. Ephedra, a compound related to ephedrine, is no longer available in the [place] due to safety concerns. Many nonprescription diet pills previously contained ephedra.  Although some studies have shown that ephedra helps with weight loss, there can be serious side effects (psychiatric symptoms, palpitations, and stomach upset), including death. There are not enough data about the safety and efficacy of chromium, ginseng, glucomannan, green tea, hydroxycitric acid, L carnitine, psyllium, pyruvate supplements, Otoe wort, and conjugated linoleic acid. Two supplements from [facility], [address] [place] (also known as the [place]hen[place] 15 pill) and Herbathin dietary supplement, have been shown to contain prescription drugs. Hoodia gordonii is a dietary supplement derived from a plant in [place]. It is not recommended because there is no proof that it is safe or effective. Bitter orange (Citrus aurantium) can increase your heart rate and blood pressure and is not recommended. SHOULD I HAVE SURGERY TO LOSE WEIGHT?  Weight loss surgery is recommended ONLY for people with one of the following:  Severe obesity (body mass index above 40) (calculator 1 and calculator 2) who have not responded to diet, exercise, or weight loss medicines   Body mass index between 35 and 40, along with a serious medical problem (including diabetes, severe joint pain, or sleep apnea) that would improve with weight loss  You should be sure that you understand the potential risks and benefits of weight loss surgery. You must be motivated and willing to make lifelong changes in how you eat to reach and maintain a healthier weight after surgery. You must also be realistic about weight loss after surgery (see 'Effectiveness of weight loss surgery' below). PREPARING FOR WEIGHT LOSS SURGERY  Most people who have weight loss surgery will meet with several specialists before surgery is scheduled. This often includes a dietitian, mental health counselor, a doctor who specializes in care of obese people, and a surgeon who performs weight loss surgery (bariatric surgeon). You may need to work with these providers for several weeks or months before surgery.   The nutritionist will explain what and how much you will be able to eat after surgery. You may also need to lose a small amount of weight before surgery. The mental health specialist will help you to cope with stress and other factors that can make it harder to lose weight or trigger you to eat   The medical doctor will determine whether you need other tests, counseling, or treatment before surgery. He or she might also help you begin a medical weight loss program so that you can lose some weight before surgery. The bariatric surgeon will meet with you to discuss the surgeries available to treat obesity. He or she will also make sure you are a good candidate for surgery. TYPES OF WEIGHT LOSS SURGERY  There are several types of weight loss surgeries, the most common being lap banding, gastric bypass, and gastric sleeve. Lap banding  Laparoscopic adjustable gastric banding (LAGB), or lap banding, is a surgery that uses an adjustable band around the opening to the stomach (figure 1). This reduces the amount of food that you can eat at one time. Lap banding is done through small incisions, with a laparoscope. The band can be adjusted after surgery, allowing you to eat more or less food. Adjustments to the size and tightness of the band are made by using a needle to add or remove fluid from a port (a small container under the skin that is connected to the band). Adding fluid to the band makes it tighter which restricts the amount of food you can eat and may help you to lose more weight. Lap banding is a popular choice because it is relatively simple to perform, can be adjusted or removed, and has a low risk of serious complications immediately after surgery. However, weight loss with the lap band depends on your ability to follow the program closely. You will need to prepare nutritious meals that [facility] with\" the band, not against it. For example, the lap band will not work well if you eat or drink a large amount of liquid calories (like ice cream).  The band will not help you to feel full when you eat/drink liquid calories. Weight loss ranges from 45 to 75 percent after two years. As an example, a person who is 120 pounds overweight could expect to lose approximately 54 to 90 pounds in the two years after lap banding. Gastric bypass  Jeff-en-Y gastric bypass, also called gastric bypass, helps you to lose weight by reducing the amount of food you can eat and reducing the number of calories and nutrients you absorb from the food you eat. To perform gastric bypass, a surgeon creates a small stomach pouch by dividing the stomach and attaching it to the small intestine. This helps you to lose weight in two ways: The smaller stomach can hold less food than before surgery. This causes you to feel full after eating a very small amount of food or liquid. Over time, the pouch might stretch, allowing you to eat more food. The body absorbs fewer calories, since food bypasses most of the stomach as well as the upper small intestine. This new arrangement seems to decrease your appetite and change how you break down foods by changing the release of various hormones. Gastric bypass can be performed as open surgery (through an incision on the abdomen) or laparoscopically, which uses smaller incisions and smaller instruments. Both the laparoscopic and open techniques have risks and benefits. You and your surgeon should work together to decide which surgery, if any, is right for you. Gastric bypass has a high success rate, and people lose an average of 62 to 68 percent of their excess body weight in the first year. Weight loss typically levels off after one to two years, with an overall excess weight loss between 50 and 75 percent. For a person who is 120 pounds overweight, an average of 60 to 90 pounds of weight loss would be expected.   Gastric sleeve  Gastric sleeve, also known as sleeve gastrectomy, is a surgery that reduces the size of the stomach and makes it into a narrow tube (figure 3). The new stomach is much smaller and produces less of the hormone (ghrelin) that causes hunger, helping you feel satisfied with less food. Sleeve gastrectomy is safer than gastric bypass because the intestines are not rearranged, and there is less chance of malnutrition. It also appears to control hunger better than lap banding. It might be safer than the lap banding because no foreign materials are used. The gastric sleeve has a good success rate, and people lose an average of 33 percent of their excess body weight in the first year. For a person who is 120 pounds overweight, this would mean losing about 40 pounds in the first year. WEIGHT LOSS SURGERY COMPLICATIONS  A variety of complications can occur with weight loss surgery. The risks of surgery depend upon which surgery you have and any medical problems you had before surgery. Some of the more common early surgical complications (one to six weeks after surgery) include:  Bleeding   Infection   Blockage or tear in the bowels   Need for further surgery  Important medical complications after surgery can include blood clots in the legs or lungs, heart attack, pneumonia, and urinary tract infection. Complications are less likely when surgery is performed in centers that are experienced in weight loss surgery. In general, centers with experience in weight loss surgery have:  Board-certified doctors and surgeons   A team of support staff (dietitians, counselors, nurses)   Long-term follow-up after surgery   Hospital staff experienced with the care of weight loss patients. This includes nurses who are trained in the care of patients immediately after surgery and anesthesiologists who are experienced in caring for the morbidly obese. EFFECTIVENESS OF WEIGHT LOSS SURGERY  The goal of weight loss surgery is to reduce the risk of illness or death associated with obesity.  Weight loss surgery can also help you to feel and look better, reduce the amount of money you spend on medicines, and cut down on sick days. As an example, weight loss surgery can improve health problems related to obesity (diabetes, high blood pressure, high cholesterol, sleep apnea) to the point that you need less or no medicine. Finally, weight loss surgery might reduce your risk of developing heart disease, cancer, and certain infections. AFTER WEIGHT LOSS SURGERY  You will need to stay in the hospital until your team feels that it is safe for you to leave (on average, one to three days). Do not drive if you are taking prescription pain medicine. Begin exercising as soon as possible once you have healed; most weight loss centers will design an exercise program for you. Once you are home, it is important to eat and drink exactly what your doctor and dietitian recommend. You will see your doctor, nurse, and dietitian on a regular basis after surgery to monitor your health, diet, and weight loss. You will be able to slowly increase how much you eat over time, although it will always be important to:  Eat small, frequent meals and not skip meals   Chew your food slowly and completely   Avoid eating while \"distracted\" (such as eating while watching TV)   Stop eating when you feel full   Drink liquids at least 30 minutes before or after eating   Avoid foods high in fat or sugar   Take vitamin supplements, as recommended  It can take several months to learn to listen to your body so that you know when you are hungry and when you are full. You may dislike foods you previously loved, and you may begin to prefer new foods. This can be a frustrating process for some people, so talk to your dietitian if you are having trouble. It usually takes between one and two years to lose weight after surgery. After reaching their goal weight, some people have plastic surgery (called \"body contouring\") to remove excess skin from the body, particularly in the abdominal area.   Before you decide to have weight loss surgery, you must commit to staying healthy for life. This includes following up with your healthcare team, exercising most days of the week, and eating a sensible diet every day. It can be difficult to develop new eating and exercise habits after weight loss surgery, and you will have to work hard to stick to your goals. Recovering from surgery and losing weight can be stressful and emotional, and it is important to have the support of family and friends. Working with a , therapist, or support group can help you through the ups and downs. WHERE TO GET MORE INFORMATION  Your healthcare provider is the best source of information for questions and concerns related to your medical problem.   This article will be updated as needed every four months on our Web site ([email or web address])

## 2022-05-24 ENCOUNTER — HOSPITAL ENCOUNTER (OUTPATIENT)
Age: 22
Setting detail: SPECIMEN
Discharge: HOME | End: 2022-05-24

## 2022-05-24 DIAGNOSIS — E55.9 VITAMIN D DEFICIENCY: ICD-10-CM

## 2022-05-24 DIAGNOSIS — E78.2 MIXED HYPERLIPIDEMIA: ICD-10-CM

## 2022-05-24 LAB
ANION GAP SERPL CALCULATED.3IONS-SCNC: 12 MMOL/L (ref 9–17)
BUN BLDV-MCNC: 12 MG/DL (ref 6–20)
CALCIUM SERPL-MCNC: 9.8 MG/DL (ref 8.6–10.4)
CHLORIDE BLD-SCNC: 99 MMOL/L (ref 98–107)
CHOLESTEROL/HDL RATIO: 4.9
CHOLESTEROL: 197 MG/DL
CO2: 25 MMOL/L (ref 20–31)
CREAT SERPL-MCNC: 0.87 MG/DL (ref 0.7–1.2)
GFR AFRICAN AMERICAN: >60 ML/MIN
GFR NON-AFRICAN AMERICAN: >60 ML/MIN
GFR SERPL CREATININE-BSD FRML MDRD: NORMAL ML/MIN/{1.73_M2}
GLUCOSE BLD-MCNC: 84 MG/DL (ref 70–99)
HDLC SERPL-MCNC: 40 MG/DL
LDL CHOLESTEROL: 139 MG/DL (ref 0–130)
POTASSIUM SERPL-SCNC: 4 MMOL/L (ref 3.7–5.3)
SODIUM BLD-SCNC: 136 MMOL/L (ref 135–144)
TRIGL SERPL-MCNC: 91 MG/DL
VITAMIN D 25-HYDROXY: 41 NG/ML

## 2022-06-27 ENCOUNTER — OFFICE VISIT (OUTPATIENT)
Dept: FAMILY MEDICINE CLINIC | Age: 22
End: 2022-06-27
Payer: COMMERCIAL

## 2022-06-27 VITALS
BODY MASS INDEX: 35.31 KG/M2 | HEIGHT: 71 IN | DIASTOLIC BLOOD PRESSURE: 68 MMHG | OXYGEN SATURATION: 97 % | WEIGHT: 252.2 LBS | HEART RATE: 85 BPM | SYSTOLIC BLOOD PRESSURE: 116 MMHG | TEMPERATURE: 97.2 F

## 2022-06-27 DIAGNOSIS — F33.0 MILD EPISODE OF RECURRENT MAJOR DEPRESSIVE DISORDER (HCC): Primary | ICD-10-CM

## 2022-06-27 PROCEDURE — G8427 DOCREV CUR MEDS BY ELIG CLIN: HCPCS | Performed by: NURSE PRACTITIONER

## 2022-06-27 PROCEDURE — 99213 OFFICE O/P EST LOW 20 MIN: CPT | Performed by: NURSE PRACTITIONER

## 2022-06-27 PROCEDURE — G8417 CALC BMI ABV UP PARAM F/U: HCPCS | Performed by: NURSE PRACTITIONER

## 2022-06-27 PROCEDURE — 1036F TOBACCO NON-USER: CPT | Performed by: NURSE PRACTITIONER

## 2022-06-27 RX ORDER — BUPROPION HYDROCHLORIDE 150 MG/1
150 TABLET ORAL EVERY MORNING
Qty: 90 TABLET | Refills: 1 | Status: SHIPPED | OUTPATIENT
Start: 2022-06-27 | End: 2022-09-19 | Stop reason: DRUGHIGH

## 2022-06-27 RX ORDER — HYDROXYZINE HYDROCHLORIDE 25 MG/1
25 TABLET, FILM COATED ORAL 3 TIMES DAILY PRN
Qty: 90 TABLET | Refills: 1 | Status: SHIPPED | OUTPATIENT
Start: 2022-06-27

## 2022-06-27 ASSESSMENT — ENCOUNTER SYMPTOMS
DIARRHEA: 0
SHORTNESS OF BREATH: 0
SORE THROAT: 0
SINUS PAIN: 0
NAUSEA: 0
ABDOMINAL PAIN: 0
EYE PAIN: 0
COUGH: 0
VOMITING: 0
BACK PAIN: 0

## 2022-06-27 ASSESSMENT — PATIENT HEALTH QUESTIONNAIRE - PHQ9
SUM OF ALL RESPONSES TO PHQ9 QUESTIONS 1 & 2: 0
SUM OF ALL RESPONSES TO PHQ QUESTIONS 1-9: 0
SUM OF ALL RESPONSES TO PHQ QUESTIONS 1-9: 0
2. FEELING DOWN, DEPRESSED OR HOPELESS: 0
1. LITTLE INTEREST OR PLEASURE IN DOING THINGS: 0
SUM OF ALL RESPONSES TO PHQ QUESTIONS 1-9: 0
SUM OF ALL RESPONSES TO PHQ QUESTIONS 1-9: 0

## 2022-06-27 NOTE — PROGRESS NOTES
[address] [facility]  [address] [facility] [address] [address]  Dept: [phone]  Dept Fax: [phone]    Chinedu Harris is a 23 y.o. male who presents today for his medicalconditions/complaints as noted below. Chinedu Harris is c/o of Depression (stopped seeing psych,would like to have meds continued here,prefers med that was alternative to wellbutrin? (Aplezine))      HPI:       78-year-old male patient presents with concerns for depression. Patient reports diagnosed with depression 2017. Has since seen therapy, counseling and psychiatry. Patient no longer seeing specialist.  Patient most recently has been taking Trileptal daily, Wellbutrin, hydroxyzine. Patient is interested in resuming medications as he felt better when he was taking them      Past Medical History:   Diagnosis Date    Headache(784.0)     Moderate episode of recurrent major depressive disorder (HCC)     Obesity     Panic attacks     Social anxiety disorder         Current Outpatient Medications   Medication Sig Dispense Refill    hydrOXYzine HCl (ATARAX) 25 MG tablet Take 1 tablet by mouth 3 times daily as needed for Anxiety 90 tablet 1    buPROPion (WELLBUTRIN XL) 150 MG extended release tablet Take 1 tablet by mouth every morning 90 tablet 1    OXcarbazepine (TRILEPTAL) 300 MG tablet       Cholecalciferol (VITAMIN D3) 50 MCG (2000 UT) TABS take 1 tablet by mouth once daily      Benzoyl Peroxide 2.5 % LIQD Apply 1 applicator topically daily 227 g 0     No current facility-administered medications for this visit. No Known Allergies    Subjective:      Review of Systems   Constitutional: Negative for chills and fatigue. HENT: Negative for congestion, ear pain, sinus pain and sore throat. Eyes: Negative for pain and visual disturbance. Respiratory: Negative for cough and shortness of breath. Cardiovascular: Negative for chest pain and palpitations. Gastrointestinal: Negative for abdominal pain, diarrhea, nausea and vomiting. Genitourinary: Negative for penile pain and testicular pain. Musculoskeletal: Negative for back pain, joint swelling and neck pain. Skin: Negative for rash. Neurological: Negative for dizziness and light-headedness. Hematological: Does not bruise/bleed easily. Psychiatric/Behavioral: Positive for dysphoric mood. All other systems reviewed and are negative.      :Objective     Physical Exam  Vitals and nursing note reviewed. Constitutional:       General: He is not in acute distress. Appearance: Normal appearance. He is not toxic-appearing. Cardiovascular:      Rate and Rhythm: Normal rate. Pulmonary:      Effort: Pulmonary effort is normal.      Breath sounds: Normal breath sounds. Skin:     General: Skin is warm and dry. Neurological:      General: No focal deficit present. Mental Status: He is alert and oriented to person, place, and time. /68 (Site: Right Upper Arm, Position: Sitting, Cuff Size: Medium Adult)   Pulse 85   Temp 97.2 °F (36.2 °C) (Tympanic)   Ht 5' 11\" (1.803 m)   Wt 252 lb 3.2 oz (114.4 kg)   SpO2 97%   BMI 35.17 kg/m²     Lab Review   Hospital Outpatient Visit on 05/24/2022   Component Date Value    Glucose 05/24/2022 84     BUN 05/24/2022 12     CREATININE 05/24/2022 0.87     Calcium 05/24/2022 9.8     Sodium 05/24/2022 136     Potassium 05/24/2022 4.0     Chloride 05/24/2022 99     CO2 05/24/2022 25     Anion Gap 05/24/2022 12     GFR Non- 05/24/2022 >60     GFR  05/24/2022 >60     GFR Comment 05/24/2022          Cholesterol 05/24/2022 197     HDL 05/24/2022 40*    LDL Cholesterol 05/24/2022 139*    Chol/HDL Ratio 05/24/2022 4.9     Triglycerides 05/24/2022 91     Vit D, 25-Hydroxy 05/24/2022 41.0          PHQ- 9   Over the past two weeks how often have you felt bothered by these problems?   0= none, 1 = 1-6 days, 2= 7-13 days, 3 = daily  1. Little interest or pleasure in doing things? 1  2. Felt down, depressed, or hopeless? 1  3. Trouble falling asleep or staying asleep? 0  4. Felt tired or with little energy? 1  5. Had a poor appetite? 0  6. Felt bad about yourself? 0  7. Trouble concentrating? 0  8. Moving or speaking slowly so that others notice? 0  9. Thought you would be better off dead? 0    Total score of  3 which Indicates minimal    1-4 = minimal, 5-9 = mild, 10-14 = moderate, 15-19 = moderate to severe, 20+ = severe        Assessment and Plan      1. Mild episode of recurrent major depressive disorder (HCC)  -     hydrOXYzine HCl (ATARAX) 25 MG tablet; Take 1 tablet by mouth 3 times daily as needed for Anxiety, Disp-90 tablet, R-1Normal  -     buPROPion (WELLBUTRIN XL) 150 MG extended release tablet; Take 1 tablet by mouth every morning, Disp-90 tablet, R-1Normal       Refills sent  Mild depression screen  Will call back and clarify his trileptal dosage            No results found for this visit on 06/27/22. Return if symptoms worsen or fail to improve. Orders Placed This Encounter   Medications    hydrOXYzine HCl (ATARAX) 25 MG tablet     Sig: Take 1 tablet by mouth 3 times daily as needed for Anxiety     Dispense:  90 tablet     Refill:  1    buPROPion (WELLBUTRIN XL) 150 MG extended release tablet     Sig: Take 1 tablet by mouth every morning     Dispense:  90 tablet     Refill:  1        Patient given educational materials - see patient instructions. Discussed use, benefit, and side effects of prescribed medications. All patientquestions answered. Pt voiced understanding. Patient given educational materials - see patient instructions. Discussed use, benefit, and side effects of prescribed medications. All patientquestions answered. Pt voiced understanding. This note was transcribed using dictation with Dragon services.  Efforts were made to correct any errors but some words may be misinterpreted.     Patient assumes risks associated with failure to complete recommended testing and treatments in a timely manner    Electronically signed by EUGENIO Forbes CNP on 6/27/2022at 4:16 PM

## 2022-06-29 ENCOUNTER — PATIENT MESSAGE (OUTPATIENT)
Dept: FAMILY MEDICINE CLINIC | Age: 22
End: 2022-06-29

## 2022-06-29 DIAGNOSIS — F33.0 MILD EPISODE OF RECURRENT MAJOR DEPRESSIVE DISORDER (HCC): Primary | ICD-10-CM

## 2022-06-29 RX ORDER — OXCARBAZEPINE 300 MG/1
600 TABLET, FILM COATED ORAL NIGHTLY
Qty: 60 TABLET | Refills: 5 | Status: SHIPPED | OUTPATIENT
Start: 2022-06-29 | End: 2022-09-19

## 2022-06-29 NOTE — TELEPHONE ENCOUNTER
From: Conner Lock  To: Jordana Teresa  Sent: 6/29/2022 4:15 PM EDT  Subject: Trileptal dosage question follow up    Hello,     I just had an appointment 2-3 days ago and you asked me to let you know what my Trileptal dosage is. It is 2 x 300mg tablets per day at bed time. Please let me know if you have any questions. Thanks.

## 2022-09-19 ENCOUNTER — OFFICE VISIT (OUTPATIENT)
Dept: FAMILY MEDICINE CLINIC | Age: 22
End: 2022-09-19
Payer: COMMERCIAL

## 2022-09-19 VITALS
WEIGHT: 256.8 LBS | HEART RATE: 80 BPM | HEIGHT: 71 IN | BODY MASS INDEX: 35.95 KG/M2 | SYSTOLIC BLOOD PRESSURE: 122 MMHG | OXYGEN SATURATION: 98 % | DIASTOLIC BLOOD PRESSURE: 76 MMHG | TEMPERATURE: 96.6 F

## 2022-09-19 DIAGNOSIS — F33.0 MILD EPISODE OF RECURRENT MAJOR DEPRESSIVE DISORDER (HCC): Primary | ICD-10-CM

## 2022-09-19 PROCEDURE — 99213 OFFICE O/P EST LOW 20 MIN: CPT | Performed by: NURSE PRACTITIONER

## 2022-09-19 PROCEDURE — G8417 CALC BMI ABV UP PARAM F/U: HCPCS | Performed by: NURSE PRACTITIONER

## 2022-09-19 PROCEDURE — 1036F TOBACCO NON-USER: CPT | Performed by: NURSE PRACTITIONER

## 2022-09-19 PROCEDURE — G8427 DOCREV CUR MEDS BY ELIG CLIN: HCPCS | Performed by: NURSE PRACTITIONER

## 2022-09-19 RX ORDER — BUPROPION HYDROCHLORIDE 300 MG/1
300 TABLET ORAL EVERY MORNING
Qty: 30 TABLET | Refills: 5 | Status: SHIPPED | OUTPATIENT
Start: 2022-09-19 | End: 2022-09-28 | Stop reason: SDUPTHER

## 2022-09-19 RX ORDER — CHOLECALCIFEROL (VITAMIN D3) 125 MCG
CAPSULE ORAL
Qty: 30 TABLET | Refills: 11 | Status: SHIPPED | OUTPATIENT
Start: 2022-09-19 | End: 2022-09-28 | Stop reason: SDUPTHER

## 2022-09-19 ASSESSMENT — ENCOUNTER SYMPTOMS
VOMITING: 0
SHORTNESS OF BREATH: 0
CHEST TIGHTNESS: 0
COUGH: 0
TROUBLE SWALLOWING: 0
ABDOMINAL PAIN: 1
NAUSEA: 0

## 2022-09-19 ASSESSMENT — PATIENT HEALTH QUESTIONNAIRE - PHQ9
SUM OF ALL RESPONSES TO PHQ QUESTIONS 1-9: 4
7. TROUBLE CONCENTRATING ON THINGS, SUCH AS READING THE NEWSPAPER OR WATCHING TELEVISION: 0
SUM OF ALL RESPONSES TO PHQ QUESTIONS 1-9: 4
5. POOR APPETITE OR OVEREATING: 0
1. LITTLE INTEREST OR PLEASURE IN DOING THINGS: 1
2. FEELING DOWN, DEPRESSED OR HOPELESS: 1
10. IF YOU CHECKED OFF ANY PROBLEMS, HOW DIFFICULT HAVE THESE PROBLEMS MADE IT FOR YOU TO DO YOUR WORK, TAKE CARE OF THINGS AT HOME, OR GET ALONG WITH OTHER PEOPLE: 0
8. MOVING OR SPEAKING SO SLOWLY THAT OTHER PEOPLE COULD HAVE NOTICED. OR THE OPPOSITE, BEING SO FIGETY OR RESTLESS THAT YOU HAVE BEEN MOVING AROUND A LOT MORE THAN USUAL: 0
4. FEELING TIRED OR HAVING LITTLE ENERGY: 1
SUM OF ALL RESPONSES TO PHQ9 QUESTIONS 1 & 2: 2
3. TROUBLE FALLING OR STAYING ASLEEP: 0
SUM OF ALL RESPONSES TO PHQ QUESTIONS 1-9: 4
9. THOUGHTS THAT YOU WOULD BE BETTER OFF DEAD, OR OF HURTING YOURSELF: 0
SUM OF ALL RESPONSES TO PHQ QUESTIONS 1-9: 4
6. FEELING BAD ABOUT YOURSELF - OR THAT YOU ARE A FAILURE OR HAVE LET YOURSELF OR YOUR FAMILY DOWN: 1

## 2022-09-19 NOTE — PROGRESS NOTES
[address]  [place], [address]  [phone]      Sonja Garcia is a 23 y.o. male who presents today for his  medicalconditions/complaints as noted below. Sonja Garcia is c/o of Abdominal Pain (Unclear when started, feels bloated with drinking, belt may have been too tight) and Discuss Medications (Wellbutrin, would like to increase the dose. Would like to know if he needs to continue Vit D supplement. Has weaned off triliptal)  . HPI:    Abdominal Pain  Pertinent negatives include no fever, headaches, nausea or vomiting. Past Medical History:   Diagnosis Date    Headache(784.0)     Moderate episode of recurrent major depressive disorder ([address]     Obesity     Panic attacks     Social anxiety disorder       History reviewed. No pertinent surgical history. Family History   Problem Relation Age of Onset    Depression Mother     High Blood Pressure Father     High Cholesterol Father     Heart Disease Father     Obesity Father     Other Father     Asthma Brother     Thyroid Disease Maternal Grandmother     Heart Disease Maternal Grandfather     Diabetes Maternal Grandfather      Social History     Tobacco Use    Smoking status: Never    Smokeless tobacco: Never   Substance Use Topics    Alcohol use: Yes     Alcohol/week: 2.0 standard drinks     Types: 2 Cans of beer per week      Current Outpatient Medications   Medication Sig Dispense Refill    buPROPion (WELLBUTRIN XL) 300 MG extended release tablet Take 1 tablet by mouth every morning 30 tablet 5    Cholecalciferol (VITAMIN D3) 50 MCG (2000 UT) TABS take 1 tablet by mouth once daily 30 tablet 11    hydrOXYzine HCl (ATARAX) 25 MG tablet Take 1 tablet by mouth 3 times daily as needed for Anxiety 90 tablet 1    Benzoyl Peroxide 2.5 % LIQD Apply 1 applicator topically daily 227 g 0     No current facility-administered medications for this visit.      No Known Allergies    Health Maintenance   Topic Date Due COVID-19 Vaccine (1) Never done    Flu vaccine (1) 09/19/2023 (Originally 9/1/2022)    Depression Monitoring  06/27/2023    DTaP/Tdap/Td vaccine (7 - Td or Tdap) 09/15/2026    Hepatitis A vaccine  Completed    Hepatitis B vaccine  Completed    Hib vaccine  Completed    HPV vaccine  Completed    Varicella vaccine  Completed    Meningococcal (ACWY) vaccine  Completed    Hepatitis C screen  Completed    HIV screen  Completed    Pneumococcal 0-64 years Vaccine  Aged Out       Subjective:      Review of Systems   Constitutional:  Negative for appetite change, chills, diaphoresis, fatigue and fever. HENT:  Negative for trouble swallowing. Eyes:  Negative for visual disturbance. Respiratory:  Negative for cough, chest tightness and shortness of breath. Cardiovascular:  Negative for chest pain, palpitations and leg swelling. Gastrointestinal:  Positive for abdominal pain (resolved). Negative for nausea and vomiting. Skin:  Negative for rash. Neurological:  Negative for dizziness and headaches. Hematological:  Negative for adenopathy. Psychiatric/Behavioral:  Positive for dysphoric mood (just does not feel dose is high enough for wellbutrin). Negative for behavioral problems, confusion, decreased concentration and sleep disturbance. The patient is not nervous/anxious. Objective:      Physical Exam  Vitals and nursing note reviewed. Constitutional:       General: He is not in acute distress. Appearance: Normal appearance. He is well-developed. He is obese. He is not ill-appearing or diaphoretic. HENT:      Head: Normocephalic and atraumatic. Mouth/Throat:      Mouth: Mucous membranes are moist.   Eyes:      Conjunctiva/sclera: Conjunctivae normal.   Cardiovascular:      Rate and Rhythm: Normal rate and regular rhythm. Pulses: Normal pulses. Heart sounds: Normal heart sounds. No murmur heard.      Comments: No LE edema  Pulmonary:      Effort: Pulmonary effort is normal. Breath sounds: Normal breath sounds. Musculoskeletal:      Cervical back: Neck supple. Skin:     General: Skin is warm and dry. Neurological:      General: No focal deficit present. Mental Status: He is alert and oriented to person, place, and time. Mental status is at baseline. Psychiatric:         Mood and Affect: Mood normal.         Behavior: Behavior normal.         Thought Content: Thought content normal.         Judgment: Judgment normal.       Assessment:       Diagnosis Orders   1. Mild episode of recurrent major depressive disorder (HCC)  buPROPion (WELLBUTRIN XL) 300 MG extended release tablet        Wt Readings from Last 3 Encounters:   09/19/22 256 lb 12.8 oz (116.5 kg)   06/27/22 252 lb 3.2 oz (114.4 kg)   04/25/22 259 lb (117.5 kg)     BP Readings from Last 3 Encounters:   09/19/22 122/76   06/27/22 116/68   04/25/22 130/72       Plan:      Return in about 6 months (around 3/19/2023) for anxiety/depression recheck. Continue with counselor  Increase wellbutrin at pt request  Patient advised to follow heart healthy, low fat  diet and 150 minutes of cardiovascular exercise per week   Vit d refilled, level stable at 41      Orders Placed This Encounter   Medications    buPROPion (WELLBUTRIN XL) 300 MG extended release tablet     Sig: Take 1 tablet by mouth every morning     Dispense:  30 tablet     Refill:  5    Cholecalciferol (VITAMIN D3) 50 MCG (2000 UT) TABS     Sig: take 1 tablet by mouth once daily     Dispense:  30 tablet     Refill:  11       Patient given educational materials - see patient instructions. Discussed use,benefit, and side effects of prescribed medications. All patient questions answered. Pt voiced understanding. Reviewed health maintenance. Instructed to continue currentmedications, diet and exercise.     Electronically signed by EUGENIO Rendon CNP, CNP on 9/19/2022 at 4:09 PM

## 2022-09-19 NOTE — PROGRESS NOTES
Visit Information    Have you changed or started any medications since your last visit including any over-the-counter medicines, vitamins, or herbal medicines? no   Have you stopped taking any of your medications? Is so, why? -  no  Are you having any side effects from any of your medications? - no    Have you seen any other physician or provider since your last visit?  no   Have you had any other diagnostic tests since your last visit?  no   Have you been seen in the emergency room and/or had an admission in a hospital since we last saw you?  no   Have you had your routine dental cleaning in the past 6 months?  no     Do you have an active MyChart account? If no, what is the barrier?   Yes    Patient Care Team:  EUGENIO Serrano CNP as PCP - General (Family Nurse Practitioner)  EUGENIO [facility] CNP [facility] PCP [facility]    Medical History Review  Past Medical, Family, and Social History reviewed and  contribute to the patient presenting condition    Health Maintenance   Topic Date Due    COVID-19 Vaccine (1) Never done    Flu vaccine (1) 09/01/2022    Depression Monitoring  06/27/2023    DTaP/Tdap/Td vaccine (7 - Td or Tdap) 09/15/2026    Hepatitis A vaccine  Completed    Hepatitis B vaccine  Completed    Hib vaccine  Completed    HPV vaccine  Completed    Varicella vaccine  Completed    Meningococcal (ACWY) vaccine  Completed    Hepatitis C screen  Completed    HIV screen  Completed    Pneumococcal 0-64 years Vaccine  Aged Out

## 2022-09-28 ENCOUNTER — TELEPHONE (OUTPATIENT)
Dept: FAMILY MEDICINE CLINIC | Age: 22
End: 2022-09-28

## 2022-09-28 DIAGNOSIS — F33.0 MILD EPISODE OF RECURRENT MAJOR DEPRESSIVE DISORDER (HCC): ICD-10-CM

## 2022-09-28 RX ORDER — BUPROPION HYDROCHLORIDE 300 MG/1
300 TABLET ORAL EVERY MORNING
Qty: 30 TABLET | Refills: 5 | Status: SHIPPED | OUTPATIENT
Start: 2022-09-28

## 2022-09-28 RX ORDER — CHOLECALCIFEROL (VITAMIN D3) 125 MCG
CAPSULE ORAL
Qty: 30 TABLET | Refills: 11 | Status: SHIPPED | OUTPATIENT
Start: 2022-09-28

## 2022-09-28 NOTE — TELEPHONE ENCOUNTER
Patient needs both of his scripts that were sent over on 9/19/2022 [facility] [address] [facility] Please advise.

## 2022-11-30 ENCOUNTER — OFFICE VISIT (OUTPATIENT)
Dept: FAMILY MEDICINE CLINIC | Age: 22
End: 2022-11-30

## 2022-11-30 VITALS
TEMPERATURE: 97.1 F | HEART RATE: 78 BPM | SYSTOLIC BLOOD PRESSURE: 116 MMHG | DIASTOLIC BLOOD PRESSURE: 76 MMHG | OXYGEN SATURATION: 98 % | HEIGHT: 71 IN | WEIGHT: 258 LBS | BODY MASS INDEX: 36.12 KG/M2

## 2022-11-30 DIAGNOSIS — R19.8 ALTERNATING CONSTIPATION AND DIARRHEA: Primary | ICD-10-CM

## 2022-11-30 DIAGNOSIS — D22.9 BENIGN MOLE: ICD-10-CM

## 2022-11-30 DIAGNOSIS — L91.8 ACQUIRED SKIN TAG: ICD-10-CM

## 2022-11-30 RX ORDER — GREEN TEA/HOODIA GORDONII 315-12.5MG
1 CAPSULE ORAL 2 TIMES DAILY
Qty: 60 TABLET | Refills: 0 | Status: SHIPPED | OUTPATIENT
Start: 2022-11-30 | End: 2022-12-30

## 2022-11-30 RX ORDER — PSYLLIUM HUSK (WITH SUGAR) 3.4 G/7 G
2 POWDER (GRAM) ORAL DAILY
Qty: 60 TABLET | Refills: 1 | Status: SHIPPED | OUTPATIENT
Start: 2022-11-30

## 2022-11-30 ASSESSMENT — ENCOUNTER SYMPTOMS
CHEST TIGHTNESS: 0
SINUS PRESSURE: 0
COUGH: 0
SORE THROAT: 0
ABDOMINAL PAIN: 0
SHORTNESS OF BREATH: 0
ANAL BLEEDING: 0
VOMITING: 0
COLOR CHANGE: 0
NAUSEA: 0
SINUS PAIN: 0
ABDOMINAL DISTENTION: 0
RHINORRHEA: 0
BLOOD IN STOOL: 0
RECTAL PAIN: 0
CONSTIPATION: 1
DIARRHEA: 1

## 2022-11-30 NOTE — PROGRESS NOTES
[address]  [place], [address]  [phone]      Prakash Lora is a 23 y.o. male who presents today for his  medicalconditions/complaints as noted below. Prakash Lora is c/o of Mass (Right side of head, has decreased in size, seems to come/go), GI Problem (Abd pain, back and forth with constipation/diarrhea, worried about IBS or crohns), and Skin Tag (Referral for derm)  . HPI:    HPI  Patient here today for evaluation of the skin tag and mole to back that are getting caught on clothing and irritated. Denies any bleeding or drainage from either site. Also states that he has a firm bump behind his right ear that has been present since childhood. States recently it was tender but he was also sick at the time. It has returned back to normal size and no longer tender. Denies ear pain or drainage. States he did have a biopsy on this as a child and it was benign. Denies headaches or dizziness. Also states he has been having alternating constipation and diarrhea with abdominal bloating. 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 Admits his diet is not good and could definitely make improvements here. Has not tried anything over-the-counter for symptoms. Denies blood in his stool, abnormal weight loss, nausea or vomiting. States he often is more constipated than diarrhea. Please note that this chart was generated using voice recognition Dragon dictation software. Although every effort was made to ensure the accuracy of this automated transcription, some errors in transcription may have occurred. Past Medical History:   Diagnosis Date    Headache(784.0)     Moderate episode of recurrent major depressive disorder ([facility] [address]     Obesity     Panic attacks     Social anxiety disorder       History reviewed. No pertinent surgical history.   Family History   Problem Relation Age of Onset Depression Mother     High Blood Pressure Father     High Cholesterol Father     Heart Disease Father     Obesity Father     Other Father     Asthma Brother     Thyroid Disease Maternal Grandmother     Heart Disease Maternal Grandfather     Diabetes Maternal Grandfather      Social History     Tobacco Use    Smoking status: Never    Smokeless tobacco: Never   Substance Use Topics    Alcohol use: Yes     Alcohol/week: 2.0 standard drinks     Types: 2 Cans of beer per week      Current Outpatient Medications   Medication Sig Dispense Refill    Probiotic Acidophilus (FLORANEX) TABS Take 1 tablet by mouth 2 times daily 60 tablet 0    [employer] Fiber Gummies 2 g CHEW Take 2 each by mouth daily 60 tablet 1    Cholecalciferol (VITAMIN D3) 50 MCG (2000 UT) TABS take 1 tablet by mouth once daily 30 tablet 11    hydrOXYzine HCl (ATARAX) 25 MG tablet Take 1 tablet by mouth 3 times daily as needed for Anxiety 90 tablet 1    Benzoyl Peroxide 2.5 % LIQD Apply 1 applicator topically daily 227 g 0     No current facility-administered medications for this visit. No Known Allergies    Health Maintenance   Topic Date Due    COVID-19 Vaccine (1) Never done    Flu vaccine (1) 09/19/2023 (Originally 8/1/2022)    Depression Monitoring  09/19/2023    DTaP/Tdap/Td vaccine (7 - Td or Tdap) 09/15/2026    Hepatitis A vaccine  Completed    Hib vaccine  Completed    HPV vaccine  Completed    Varicella vaccine  Completed    Meningococcal (ACWY) vaccine  Completed    Hepatitis C screen  Completed    HIV screen  Completed    Pneumococcal 0-64 years Vaccine  Aged Out       Subjective:      Review of Systems   Constitutional:  Negative for appetite change, chills, diaphoresis, fatigue and fever. HENT:  Negative for dental problem, ear discharge, ear pain, postnasal drip, rhinorrhea, sinus pressure, sinus pain, sneezing and sore throat. Eyes:  Negative for visual disturbance.    Respiratory:  Negative for cough, chest tightness and shortness of back: Normal range of motion and neck supple. Lymphadenopathy:      Cervical: No cervical adenopathy. Skin:     General: Skin is warm and dry. Capillary Refill: Capillary refill takes less than 2 seconds. Findings: No rash. Comments: S: The patient complains of symptomatic skin tags on the right upper back. These are irritated by clothing, jewelry and rubbing. O: Patient appears well. benign skin tags are noted on the right upper back. A: Skin tag x1    P: Skin tags are snipped off using Betadine for cleansing and sterile iris scissors. Local anesthesia was not used. These pathognomonic lesions are not sent for pathology. Neurological:      General: No focal deficit present. Mental Status: He is alert and oriented to person, place, and time. Psychiatric:         Mood and Affect: Mood normal.         Behavior: Behavior normal.         Thought Content: Thought content normal.         Judgment: Judgment normal.   SUBJECTIVE:   Glenn Bacon is a 23 y.o. male who presents for lesion removal. We have already discussed this procedure, including option of not performing surgery, technique of surgery and potential for scarring at a recent visit. OBJECTIVE:   Patient appears well. Vitals are normal.  Skin: benign mole irritated on left mid back    ASSESSMENT:   normal complete skin exam, no suspicious lesions, nevi pigment even, raised, border sharp, symmetrical, and non ulcerated size 2 mm noted on the mid left back    PLAN:   After informed consent was obtained, using Betadine for cleansing and 1% Lidocaine with epinephrine for anesthetic, with sterile technique, shave excision was performed. Antibiotic dressing is applied, and wound care instructions provided. Be alert for any signs of cutaneous infection. The procedure was well tolerated without complications. Follow up: the patient may return prn. Assessment:       Diagnosis Orders   1.  Alternating constipation and diarrhea  Probiotic Acidophilus (FLORANEX) TABS    [employer]S Fiber Gummies 2 g CHEW      2. Acquired skin tag        3. Benign mole  87767 - DC SHAV SKIN LES <5MM TRUNK,ARM,LEG        Wt Readings from Last 3 Encounters:   11/30/22 258 lb (117 kg)   09/19/22 256 lb 12.8 oz (116.5 kg)   06/27/22 252 lb 3.2 oz (114.4 kg)       Plan:      Return if symptoms worsen or fail to improve. Orders Placed This Encounter   Procedures    60162 - DC SHAV SKIN LES <5MM TRUNK,ARM,LEG     Orders Placed This Encounter   Medications    Probiotic Acidophilus (FLORANEX) TABS     Sig: Take 1 tablet by mouth 2 times daily     Dispense:  60 tablet     Refill:  0    [employer]S Fiber Gummies 2 g CHEW     Sig: Take 2 each by mouth daily     Dispense:  60 tablet     Refill:  1   GI:  Start daily probiotic and fiber supplement for next 30 days, and FODMAP diet and Call INB or worsening will then consider labs and or colonoscopy    Skin:  Monitor for s/s of infection    Mastoid area:  No concern today, could have been tender d/t recent illness/lymph node? Call INB or worsening      Patient given educational materials - see patient instructions. Discussed use,benefit, and side effects of prescribed medications. All patient questions answered. Pt voiced understanding. Reviewed health maintenance. Instructed to continue currentmedications, diet and exercise.     Electronically signed by EUGENIO Chi CNP, CNP on 11/30/2022 at 4:57 PM

## 2022-11-30 NOTE — PROGRESS NOTES
Visit Information    Have you changed or started any medications since your last visit including any over-the-counter medicines, vitamins, or herbal medicines? no   Have you stopped taking any of your medications? Is so, why? -  no  Are you having any side effects from any of your medications? - no    Have you seen any other physician or provider since your last visit?  no   Have you had any other diagnostic tests since your last visit?  no   Have you been seen in the emergency room and/or had an admission in a hospital since we last saw you?  no   Have you had your routine dental cleaning in the past 6 months?  no     Do you have an active MyChart account? If no, what is the barrier?   Yes    Patient Care Team:  EUGENIO Fuller CNP as PCP - General (Family Nurse Practitioner)  EUGENIO Fuller CNP as PCP [facility]    Medical History Review  Past Medical, Family, and Social History reviewed and  contribute to the patient presenting condition    Health Maintenance   Topic Date Due    COVID-19 Vaccine (1) Never done    Flu vaccine (1) 09/19/2023 (Originally 8/1/2022)    Depression Monitoring  09/19/2023    DTaP/Tdap/Td vaccine (7 - Td or Tdap) 09/15/2026    Hepatitis A vaccine  Completed    Hib vaccine  Completed    HPV vaccine  Completed    Varicella vaccine  Completed    Meningococcal (ACWY) vaccine  Completed    Hepatitis C screen  Completed    HIV screen  Completed    Pneumococcal 0-64 years Vaccine  Aged Out

## 2022-12-01 ENCOUNTER — TELEPHONE (OUTPATIENT)
Dept: FAMILY MEDICINE CLINIC | Age: 22
End: 2022-12-01

## 2023-02-09 ENCOUNTER — OFFICE VISIT (OUTPATIENT)
Dept: FAMILY MEDICINE CLINIC | Age: 23
End: 2023-02-09
Payer: COMMERCIAL

## 2023-02-09 VITALS
TEMPERATURE: 96.3 F | DIASTOLIC BLOOD PRESSURE: 72 MMHG | OXYGEN SATURATION: 98 % | SYSTOLIC BLOOD PRESSURE: 120 MMHG | WEIGHT: 261.6 LBS | HEART RATE: 74 BPM | BODY MASS INDEX: 36.62 KG/M2 | HEIGHT: 71 IN

## 2023-02-09 DIAGNOSIS — R35.0 URINARY FREQUENCY: Primary | ICD-10-CM

## 2023-02-09 DIAGNOSIS — N32.81 OAB (OVERACTIVE BLADDER): ICD-10-CM

## 2023-02-09 DIAGNOSIS — E73.9 LACTOSE INTOLERANCE: ICD-10-CM

## 2023-02-09 DIAGNOSIS — F33.0 MILD EPISODE OF RECURRENT MAJOR DEPRESSIVE DISORDER (HCC): ICD-10-CM

## 2023-02-09 LAB
BILIRUBIN, POC: NEGATIVE
BLOOD URINE, POC: NEGATIVE
CLARITY, POC: CLEAR
COLOR, POC: YELLOW
GLUCOSE URINE, POC: NEGATIVE
KETONES, POC: NEGATIVE
LEUKOCYTE EST, POC: NEGATIVE
NITRITE, POC: NEGATIVE
PH, POC: 5.5
PROTEIN, POC: NEGATIVE
SPECIFIC GRAVITY, POC: 1.02
UROBILINOGEN, POC: 0.2

## 2023-02-09 PROCEDURE — G8427 DOCREV CUR MEDS BY ELIG CLIN: HCPCS | Performed by: NURSE PRACTITIONER

## 2023-02-09 PROCEDURE — 81002 URINALYSIS NONAUTO W/O SCOPE: CPT | Performed by: NURSE PRACTITIONER

## 2023-02-09 PROCEDURE — G8484 FLU IMMUNIZE NO ADMIN: HCPCS | Performed by: NURSE PRACTITIONER

## 2023-02-09 PROCEDURE — G8417 CALC BMI ABV UP PARAM F/U: HCPCS | Performed by: NURSE PRACTITIONER

## 2023-02-09 PROCEDURE — 1036F TOBACCO NON-USER: CPT | Performed by: NURSE PRACTITIONER

## 2023-02-09 PROCEDURE — 99214 OFFICE O/P EST MOD 30 MIN: CPT | Performed by: NURSE PRACTITIONER

## 2023-02-09 RX ORDER — OXYBUTYNIN CHLORIDE 10 MG/1
10 TABLET, EXTENDED RELEASE ORAL DAILY
Qty: 30 TABLET | Refills: 3 | Status: SHIPPED | OUTPATIENT
Start: 2023-02-09

## 2023-02-09 ASSESSMENT — ENCOUNTER SYMPTOMS
SHORTNESS OF BREATH: 0
CONSTIPATION: 1
ABDOMINAL PAIN: 0
COUGH: 0
VOMITING: 0
DIARRHEA: 0
ANAL BLEEDING: 0
NAUSEA: 0
BLOOD IN STOOL: 0
CHEST TIGHTNESS: 0
ABDOMINAL DISTENTION: 0

## 2023-02-09 ASSESSMENT — PATIENT HEALTH QUESTIONNAIRE - PHQ9
4. FEELING TIRED OR HAVING LITTLE ENERGY: 0
3. TROUBLE FALLING OR STAYING ASLEEP: 0
8. MOVING OR SPEAKING SO SLOWLY THAT OTHER PEOPLE COULD HAVE NOTICED. OR THE OPPOSITE, BEING SO FIGETY OR RESTLESS THAT YOU HAVE BEEN MOVING AROUND A LOT MORE THAN USUAL: 0
SUM OF ALL RESPONSES TO PHQ9 QUESTIONS 1 & 2: 2
6. FEELING BAD ABOUT YOURSELF - OR THAT YOU ARE A FAILURE OR HAVE LET YOURSELF OR YOUR FAMILY DOWN: 0
7. TROUBLE CONCENTRATING ON THINGS, SUCH AS READING THE NEWSPAPER OR WATCHING TELEVISION: 0
SUM OF ALL RESPONSES TO PHQ QUESTIONS 1-9: 2
10. IF YOU CHECKED OFF ANY PROBLEMS, HOW DIFFICULT HAVE THESE PROBLEMS MADE IT FOR YOU TO DO YOUR WORK, TAKE CARE OF THINGS AT HOME, OR GET ALONG WITH OTHER PEOPLE: 0
9. THOUGHTS THAT YOU WOULD BE BETTER OFF DEAD, OR OF HURTING YOURSELF: 0
SUM OF ALL RESPONSES TO PHQ QUESTIONS 1-9: 2
1. LITTLE INTEREST OR PLEASURE IN DOING THINGS: 1
2. FEELING DOWN, DEPRESSED OR HOPELESS: 1
SUM OF ALL RESPONSES TO PHQ QUESTIONS 1-9: 2
SUM OF ALL RESPONSES TO PHQ QUESTIONS 1-9: 2
5. POOR APPETITE OR OVEREATING: 0

## 2023-02-09 NOTE — PROGRESS NOTES
[address]  [place], [address]  [phone]      Armida Noel is a 20 y.o. male who presents today for his  medicalconditions/complaints as noted below. Armida Noel is c/o of Urinary Frequency, GI Problem (Thinks he is lactose intolerant. Question if able to get scan to make sure there arent any issues, has stopped drinking milk ), and Depression (Sees a therapist, mx meds that didn't work for him, questions on options for tx, states he has issues with expressing emotions)    HPI:    Urinary Frequency   Associated symptoms include frequency and urgency (sometimes after drinking). Pertinent negatives include no chills, hematuria, nausea or vomiting. GI Problem  Primary symptoms do not include fever, fatigue, abdominal pain, nausea, vomiting, diarrhea, dysuria or rash. The illness is also significant for constipation. The illness does not include chills. DepressionPatient presents with the following symptoms: nervousness/anxiety. Patient is not experiencing: confusion, decreased concentration, palpitations and shortness of breath. Urinary frequency:  Started years ago. Denies flank pain, hematuria, urgency. Feels he doesn't drink enough water. Feels he goes sometimes once ever hour. Sometimes every 1-2 hours. Feels he has moderate urine output when he goes. Since last appt has been cutting out lactose which has helped with bloating/gas. Is still taking fiber gummy and also probiotic on some days. Feels whenever he eats he has to go have BM, but only having actual BM every other day. Attempted FODMAP diet and did make some changes. Depression: seeing therapist. Josh Mcmullen mx meds with no relief. Has trouble expressing or feel emotions. Also has anxiety. Has had a lot of sexual SE with meds in the past. Option to trying trintellix again.    Past Medical History:   Diagnosis Date    Headache(784.0)     Moderate episode of recurrent major depressive disorder ([facility] [address]     Obesity     Panic attacks     Social anxiety disorder       History reviewed. No pertinent surgical history. Family History   Problem Relation Age of Onset    Depression Mother     High Blood Pressure Father     High Cholesterol Father     Heart Disease Father     Obesity Father     Other Father     Asthma Brother     Thyroid Disease Maternal Grandmother     Heart Disease Maternal Grandfather     Diabetes Maternal Grandfather      Social History     Tobacco Use    Smoking status: Never    Smokeless tobacco: Never   Substance Use Topics    Alcohol use: Yes     Alcohol/week: 2.0 standard drinks     Types: 2 Cans of beer per week      Current Outpatient Medications   Medication Sig Dispense Refill    oxybutynin (DITROPAN XL) 10 MG extended release tablet Take 1 tablet by mouth daily 30 tablet 3    VORTIoxetine (TRINTELLIX) 10 MG TABS tablet Take 1 tablet by mouth daily 30 tablet 1    [employer] Fiber Gummies 2 g CHEW Take 2 each by mouth daily 60 tablet 1    Cholecalciferol (VITAMIN D3) 50 MCG (2000 UT) TABS take 1 tablet by mouth once daily 30 tablet 11    hydrOXYzine HCl (ATARAX) 25 MG tablet Take 1 tablet by mouth 3 times daily as needed for Anxiety 90 tablet 1    Benzoyl Peroxide 2.5 % LIQD Apply 1 applicator topically daily 227 g 0     No current facility-administered medications for this visit.      No Known Allergies    Health Maintenance   Topic Date Due    COVID-19 Vaccine (1) Never done    Flu vaccine (1) 09/19/2023 (Originally 8/1/2022)    Depression Monitoring  09/19/2023    DTaP/Tdap/Td vaccine (7 - Td or Tdap) 09/15/2026    Hepatitis A vaccine  Completed    Hib vaccine  Completed    HPV vaccine  Completed    Varicella vaccine  Completed    Meningococcal (ACWY) vaccine  Completed    Hepatitis C screen  Completed    HIV screen  Completed    Pneumococcal 0-64 years Vaccine  Aged Out       Subjective:      Review of Systems   Constitutional:  Negative for appetite change, chills, diaphoresis, fatigue and fever. Eyes:  Negative for visual disturbance. Respiratory:  Negative for cough, chest tightness and shortness of breath. Cardiovascular:  Negative for chest pain, palpitations and leg swelling. Gastrointestinal:  Positive for constipation. Negative for abdominal distention, abdominal pain, anal bleeding, blood in stool, diarrhea, nausea and vomiting. Genitourinary:  Positive for frequency and urgency (sometimes after drinking). Negative for difficulty urinating, dysuria, enuresis, hematuria, penile pain and testicular pain. Skin:  Negative for rash. Neurological:  Negative for dizziness and headaches. Hematological:  Negative for adenopathy. Psychiatric/Behavioral:  Positive for depression and dysphoric mood. Negative for agitation, behavioral problems, confusion, decreased concentration and sleep disturbance. The patient is nervous/anxious. Objective:      Physical Exam  Vitals and nursing note reviewed. Exam conducted with a chaperone present. Constitutional:       General: He is not in acute distress. Appearance: Normal appearance. He is obese. He is not ill-appearing or diaphoretic. HENT:      Head: Normocephalic and atraumatic. Eyes:      Conjunctiva/sclera: Conjunctivae normal.   Pulmonary:      Effort: Pulmonary effort is normal.   Abdominal:      General: Bowel sounds are normal.      Palpations: Abdomen is soft. Tenderness: There is no abdominal tenderness. Musculoskeletal:      Cervical back: Neck supple. Skin:     General: Skin is warm and dry. Coloration: Skin is not pale. Findings: No erythema or rash. Neurological:      General: No focal deficit present. Mental Status: He is alert and oriented to person, place, and time. Mental status is at baseline. Gait: Gait normal.   Psychiatric:         Attention and Perception: Attention and perception normal.         Mood and Affect: Mood normal. Affect is flat. Speech: Speech normal.         Behavior: Behavior normal. Behavior is cooperative. Thought Content: Thought content normal.         Cognition and Memory: Cognition and memory normal.         Judgment: Judgment normal.       Assessment:       Diagnosis Orders   1. Urinary frequency  POCT Urinalysis no Micro    oxybutynin (DITROPAN XL) 10 MG extended release tablet      2. OAB (overactive bladder)  oxybutynin (DITROPAN XL) 10 MG extended release tablet      3. Lactose intolerance        4. Mild episode of recurrent major depressive disorder (HCC)  VORTIoxetine (TRINTELLIX) 10 MG TABS tablet          Chemistry        Component Value Date/Time     05/24/2022 1515    K 4.0 05/24/2022 1515    CL 99 05/24/2022 1515    CO2 25 05/24/2022 1515    BUN 12 05/24/2022 1515    CREATININE 0.87 05/24/2022 1515        Component Value Date/Time    CALCIUM 9.8 05/24/2022 1515    ALKPHOS 74 04/25/2018 2035    AST 20 04/25/2018 2035    ALT 25 04/25/2018 2035    BILITOT 0.55 04/25/2018 2035        Wt Readings from Last 3 Encounters:   02/09/23 261 lb 9.6 oz (118.7 kg)   11/30/22 258 lb (117 kg)   09/19/22 256 lb 12.8 oz (116.5 kg)     Results for orders placed or performed in visit on 02/09/23   POCT Urinalysis no Micro   Result Value Ref Range    Color, UA yellow     Clarity, UA clear     Glucose, UA POC negative     Bilirubin, UA negative     Ketones, UA negative     Spec Grav, UA 1.025     Blood, UA POC negative     pH, UA 5.5     Protein, UA POC negative     Urobilinogen, UA 0.2     Leukocytes, UA negative     Nitrite, UA negative        Plan:      Return if symptoms worsen or fail to improve.   Orders Placed This Encounter   Procedures    POCT Urinalysis no Micro     Orders Placed This Encounter   Medications    oxybutynin (DITROPAN XL) 10 MG extended release tablet     Sig: Take 1 tablet by mouth daily     Dispense:  30 tablet     Refill:  3    VORTIoxetine (TRINTELLIX) 10 MG TABS tablet     Sig: Take 1 tablet by mouth daily     Dispense:  30 tablet     Refill:  1     Mood:  Trial trintellix  Call with update in next 30 days  Continue with counselor  PGT in chart from 2018    Bladder:  Appears as OAB  Will trial ditropan for now  Call with update  Limit large quantities of fluid    Gi:  Declines referral, colonoscopy or xray today  Wants to focus for now on consistent use of probiotic and fiber and avoiding lactose  Call INB or worsening   I suspect IBS    Patient given educational materials - see patient instructions. Discussed use,benefit, and side effects of prescribed medications. All patient questions answered. Pt voiced understanding. Reviewed health maintenance. Instructed to continue currentmedications, diet and exercise.     Electronically signed by EUGENIO Arredondo CNP, CNP on 2/9/2023 at 3:37 PM

## 2023-05-02 DIAGNOSIS — R19.8 ALTERNATING CONSTIPATION AND DIARRHEA: ICD-10-CM

## 2023-05-23 ENCOUNTER — OFFICE VISIT (OUTPATIENT)
Dept: FAMILY MEDICINE CLINIC | Age: 23
End: 2023-05-23
Payer: COMMERCIAL

## 2023-05-23 VITALS
DIASTOLIC BLOOD PRESSURE: 84 MMHG | BODY MASS INDEX: 36.85 KG/M2 | SYSTOLIC BLOOD PRESSURE: 110 MMHG | WEIGHT: 263.2 LBS | TEMPERATURE: 97 F | HEIGHT: 71 IN | OXYGEN SATURATION: 98 % | HEART RATE: 61 BPM

## 2023-05-23 DIAGNOSIS — E55.9 VITAMIN D DEFICIENCY: ICD-10-CM

## 2023-05-23 DIAGNOSIS — Z87.19 HISTORY OF DENTAL PROBLEMS: ICD-10-CM

## 2023-05-23 DIAGNOSIS — E78.2 MIXED HYPERLIPIDEMIA: ICD-10-CM

## 2023-05-23 DIAGNOSIS — R44.8 SENSATION OF PRESSURE IN FACE: Primary | ICD-10-CM

## 2023-05-23 PROCEDURE — G8427 DOCREV CUR MEDS BY ELIG CLIN: HCPCS | Performed by: NURSE PRACTITIONER

## 2023-05-23 PROCEDURE — 99213 OFFICE O/P EST LOW 20 MIN: CPT | Performed by: NURSE PRACTITIONER

## 2023-05-23 PROCEDURE — 1036F TOBACCO NON-USER: CPT | Performed by: NURSE PRACTITIONER

## 2023-05-23 PROCEDURE — G8417 CALC BMI ABV UP PARAM F/U: HCPCS | Performed by: NURSE PRACTITIONER

## 2023-05-23 RX ORDER — CYANOCOBALAMIN (VITAMIN B-12) 500 MCG
TABLET ORAL
COMMUNITY

## 2023-05-23 RX ORDER — NAPROXEN 375 MG/1
375 TABLET ORAL 2 TIMES DAILY WITH MEALS
Qty: 60 TABLET | Refills: 0 | Status: SHIPPED | OUTPATIENT
Start: 2023-05-23

## 2023-05-23 SDOH — ECONOMIC STABILITY: INCOME INSECURITY: HOW HARD IS IT FOR YOU TO PAY FOR THE VERY BASICS LIKE FOOD, HOUSING, MEDICAL CARE, AND HEATING?: NOT HARD AT ALL

## 2023-05-23 SDOH — ECONOMIC STABILITY: FOOD INSECURITY: WITHIN THE PAST 12 MONTHS, THE FOOD YOU BOUGHT JUST DIDN'T LAST AND YOU DIDN'T HAVE MONEY TO GET MORE.: NEVER TRUE

## 2023-05-23 SDOH — ECONOMIC STABILITY: HOUSING INSECURITY
IN THE LAST 12 MONTHS, WAS THERE A TIME WHEN YOU DID NOT HAVE A STEADY PLACE TO SLEEP OR SLEPT IN A SHELTER (INCLUDING NOW)?: NO

## 2023-05-23 SDOH — ECONOMIC STABILITY: FOOD INSECURITY: WITHIN THE PAST 12 MONTHS, YOU WORRIED THAT YOUR FOOD WOULD RUN OUT BEFORE YOU GOT MONEY TO BUY MORE.: NEVER TRUE

## 2023-05-23 ASSESSMENT — ENCOUNTER SYMPTOMS
SHORTNESS OF BREATH: 0
SINUS PRESSURE: 0
VOMITING: 0
ABDOMINAL PAIN: 0
NAUSEA: 0
FACIAL SWELLING: 0
COUGH: 0
TROUBLE SWALLOWING: 0
VOICE CHANGE: 0
RHINORRHEA: 0
CHEST TIGHTNESS: 0
SINUS PAIN: 0

## 2023-05-23 NOTE — PROGRESS NOTES
Visit Information    Have you changed or started any medications since your last visit including any over-the-counter medicines, vitamins, or herbal medicines? no   Have you stopped taking any of your medications? Is so, why? -  no  Are you having any side effects from any of your medications? - no    Have you seen any other physician or provider since your last visit?  no   Have you had any other diagnostic tests since your last visit?  no   Have you been seen in the emergency room and/or had an admission in a hospital since we last saw you?  no   Have you had your routine dental cleaning in the past 6 months?  no     Do you have an active MyChart account? If no, what is the barrier?   Yes    Patient Care Team:  EUGENIO Boss CNP as PCP - General (Family Nurse Practitioner)  EUGENIO Boss CNP as PCP - Empaneled Provider    Medical History Review  Past Medical, Family, and Social History reviewed and  contribute to the patient presenting condition    Health Maintenance   Topic Date Due    COVID-19 Vaccine (1) Never done    Flu vaccine (Season Ended) 09/19/2023 (Originally 8/1/2023)    Depression Monitoring  02/09/2024    DTaP/Tdap/Td vaccine (7 - Td or Tdap) 09/15/2026    Hepatitis A vaccine  Completed    Hib vaccine  Completed    HPV vaccine  Completed    Varicella vaccine  Completed    Meningococcal (ACWY) vaccine  Completed    Hepatitis C screen  Completed    HIV screen  Completed    Pneumococcal 0-64 years Vaccine  Aged Out    Hepatitis B vaccine  Discontinued
Take 1 tab BID 60 tablet 5    [employer] Fiber Gummies 2 g CHEW Take 2 each by mouth daily 60 tablet 1    Cholecalciferol (VITAMIN D3) 50 MCG (2000 UT) TABS take 1 tablet by mouth once daily 30 tablet 11    hydrOXYzine HCl (ATARAX) 25 MG tablet Take 1 tablet by mouth 3 times daily as needed for Anxiety 90 tablet 1    Benzoyl Peroxide 2.5 % LIQD Apply 1 applicator topically daily (Patient not taking: Reported on 5/23/2023) 227 g 0     No current facility-administered medications for this visit. No Known Allergies    Health Maintenance   Topic Date Due    COVID-19 Vaccine (1) Never done    Flu vaccine (Season Ended) 09/19/2023 (Originally 8/1/2023)    Depression Monitoring  02/09/2024    DTaP/Tdap/Td vaccine (7 - Td or Tdap) 09/15/2026    Hepatitis A vaccine  Completed    Hib vaccine  Completed    HPV vaccine  Completed    Varicella vaccine  Completed    Meningococcal (ACWY) vaccine  Completed    Hepatitis C screen  Completed    HIV screen  Completed    Pneumococcal 0-64 years Vaccine  Aged Out    Hepatitis B vaccine  Discontinued       Subjective:      Review of Systems   Constitutional:  Negative for appetite change, chills, diaphoresis, fatigue and fever. HENT:  Positive for dental problem. Negative for congestion, ear pain, facial swelling, mouth sores, postnasal drip, rhinorrhea, sinus pressure, sinus pain, sneezing, trouble swallowing and voice change. Eyes:  Negative for visual disturbance. Respiratory:  Negative for cough, chest tightness and shortness of breath. Cardiovascular:  Negative for chest pain, palpitations and leg swelling. Gastrointestinal:  Negative for abdominal pain, nausea and vomiting. Skin:  Negative for rash. Neurological:  Negative for dizziness and headaches. Hematological:  Negative for adenopathy. Psychiatric/Behavioral:  Negative for sleep disturbance. Objective:      Physical Exam  Vitals and nursing note reviewed.    Constitutional:       General: He is not in

## 2024-09-24 ENCOUNTER — TELEPHONE (OUTPATIENT)
Dept: FAMILY MEDICINE CLINIC | Age: 24
End: 2024-09-24

## 2024-09-30 ENCOUNTER — OFFICE VISIT (OUTPATIENT)
Dept: FAMILY MEDICINE CLINIC | Age: 24
End: 2024-09-30
Payer: MEDICAID

## 2024-09-30 VITALS
BODY MASS INDEX: 38.78 KG/M2 | HEIGHT: 71 IN | HEART RATE: 86 BPM | DIASTOLIC BLOOD PRESSURE: 64 MMHG | SYSTOLIC BLOOD PRESSURE: 116 MMHG | WEIGHT: 277 LBS | OXYGEN SATURATION: 96 % | TEMPERATURE: 97.5 F

## 2024-09-30 DIAGNOSIS — F40.10 SOCIAL ANXIETY DISORDER: ICD-10-CM

## 2024-09-30 DIAGNOSIS — F33.0 MILD EPISODE OF RECURRENT MAJOR DEPRESSIVE DISORDER (HCC): ICD-10-CM

## 2024-09-30 DIAGNOSIS — Z00.00 ENCOUNTER FOR WELL ADULT EXAM WITHOUT ABNORMAL FINDINGS: Primary | ICD-10-CM

## 2024-09-30 DIAGNOSIS — Z23 FLU VACCINE NEED: ICD-10-CM

## 2024-09-30 PROCEDURE — 90661 CCIIV3 VAC ABX FR 0.5 ML IM: CPT | Performed by: NURSE PRACTITIONER

## 2024-09-30 PROCEDURE — 99395 PREV VISIT EST AGE 18-39: CPT | Performed by: NURSE PRACTITIONER

## 2024-09-30 PROCEDURE — 90471 IMMUNIZATION ADMIN: CPT | Performed by: NURSE PRACTITIONER

## 2024-09-30 PROCEDURE — 90472 IMMUNIZATION ADMIN EACH ADD: CPT | Performed by: NURSE PRACTITIONER

## 2024-09-30 SDOH — ECONOMIC STABILITY: FOOD INSECURITY: WITHIN THE PAST 12 MONTHS, THE FOOD YOU BOUGHT JUST DIDN'T LAST AND YOU DIDN'T HAVE MONEY TO GET MORE.: NEVER TRUE

## 2024-09-30 SDOH — ECONOMIC STABILITY: INCOME INSECURITY: HOW HARD IS IT FOR YOU TO PAY FOR THE VERY BASICS LIKE FOOD, HOUSING, MEDICAL CARE, AND HEATING?: NOT HARD AT ALL

## 2024-09-30 SDOH — ECONOMIC STABILITY: FOOD INSECURITY: WITHIN THE PAST 12 MONTHS, YOU WORRIED THAT YOUR FOOD WOULD RUN OUT BEFORE YOU GOT MONEY TO BUY MORE.: NEVER TRUE

## 2024-09-30 SDOH — ECONOMIC STABILITY: TRANSPORTATION INSECURITY
IN THE PAST 12 MONTHS, HAS LACK OF TRANSPORTATION KEPT YOU FROM MEETINGS, WORK, OR FROM GETTING THINGS NEEDED FOR DAILY LIVING?: NO

## 2024-09-30 ASSESSMENT — ENCOUNTER SYMPTOMS
RECTAL PAIN: 0
SORE THROAT: 0
CONSTIPATION: 0
COUGH: 0
SHORTNESS OF BREATH: 0
RHINORRHEA: 0
ANAL BLEEDING: 1
EYE PAIN: 0
CHEST TIGHTNESS: 0
ABDOMINAL PAIN: 0
COLOR CHANGE: 0
VOMITING: 0
NAUSEA: 0
DIARRHEA: 0

## 2024-09-30 ASSESSMENT — PATIENT HEALTH QUESTIONNAIRE - PHQ9
4. FEELING TIRED OR HAVING LITTLE ENERGY: MORE THAN HALF THE DAYS
5. POOR APPETITE OR OVEREATING: SEVERAL DAYS
7. TROUBLE CONCENTRATING ON THINGS, SUCH AS READING THE NEWSPAPER OR WATCHING TELEVISION: NOT AT ALL
5. POOR APPETITE OR OVEREATING: SEVERAL DAYS
9. THOUGHTS THAT YOU WOULD BE BETTER OFF DEAD, OR OF HURTING YOURSELF: NOT AT ALL
SUM OF ALL RESPONSES TO PHQ QUESTIONS 1-9: 6
SUM OF ALL RESPONSES TO PHQ QUESTIONS 1-9: 6
9. THOUGHTS THAT YOU WOULD BE BETTER OFF DEAD, OR OF HURTING YOURSELF: NOT AT ALL
2. FEELING DOWN, DEPRESSED OR HOPELESS: SEVERAL DAYS
3. TROUBLE FALLING OR STAYING ASLEEP: SEVERAL DAYS
6. FEELING BAD ABOUT YOURSELF - OR THAT YOU ARE A FAILURE OR HAVE LET YOURSELF OR YOUR FAMILY DOWN: SEVERAL DAYS
SUM OF ALL RESPONSES TO PHQ9 QUESTIONS 1 & 2: 1
1. LITTLE INTEREST OR PLEASURE IN DOING THINGS: NOT AT ALL
4. FEELING TIRED OR HAVING LITTLE ENERGY: MORE THAN HALF THE DAYS
6. FEELING BAD ABOUT YOURSELF - OR THAT YOU ARE A FAILURE OR HAVE LET YOURSELF OR YOUR FAMILY DOWN: SEVERAL DAYS
8. MOVING OR SPEAKING SO SLOWLY THAT OTHER PEOPLE COULD HAVE NOTICED. OR THE OPPOSITE - BEING SO FIDGETY OR RESTLESS THAT YOU HAVE BEEN MOVING AROUND A LOT MORE THAN USUAL: NOT AT ALL
7. TROUBLE CONCENTRATING ON THINGS, SUCH AS READING THE NEWSPAPER OR WATCHING TELEVISION: NOT AT ALL
1. LITTLE INTEREST OR PLEASURE IN DOING THINGS: NOT AT ALL
SUM OF ALL RESPONSES TO PHQ QUESTIONS 1-9: 6
10. IF YOU CHECKED OFF ANY PROBLEMS, HOW DIFFICULT HAVE THESE PROBLEMS MADE IT FOR YOU TO DO YOUR WORK, TAKE CARE OF THINGS AT HOME, OR GET ALONG WITH OTHER PEOPLE: SOMEWHAT DIFFICULT
SUM OF ALL RESPONSES TO PHQ QUESTIONS 1-9: 6
8. MOVING OR SPEAKING SO SLOWLY THAT OTHER PEOPLE COULD HAVE NOTICED. OR THE OPPOSITE, BEING SO FIGETY OR RESTLESS THAT YOU HAVE BEEN MOVING AROUND A LOT MORE THAN USUAL: NOT AT ALL
SUM OF ALL RESPONSES TO PHQ QUESTIONS 1-9: 6
3. TROUBLE FALLING OR STAYING ASLEEP: SEVERAL DAYS
2. FEELING DOWN, DEPRESSED OR HOPELESS: SEVERAL DAYS
10. IF YOU CHECKED OFF ANY PROBLEMS, HOW DIFFICULT HAVE THESE PROBLEMS MADE IT FOR YOU TO DO YOUR WORK, TAKE CARE OF THINGS AT HOME, OR GET ALONG WITH OTHER PEOPLE: SOMEWHAT DIFFICULT

## 2024-09-30 NOTE — PROGRESS NOTES
Visit Information    Have you changed or started any medications since your last visit including any over-the-counter medicines, vitamins, or herbal medicines? no   Have you stopped taking any of your medications? Is so, why? -  no  Are you having any side effects from any of your medications? - no    Have you seen any other physician or provider since your last visit?  no   Have you had any other diagnostic tests since your last visit?  no   Have you been seen in the emergency room and/or had an admission in a hospital since we last saw you?  no   Have you had your routine dental cleaning in the past 6 months?  no     Do you have an active MyChart account? If no, what is the barrier?  Yes    Patient Care Team:  Liana Mistry APRN - CNP as PCP - General (Family Nurse Practitioner)  Liana Mistry APRN - CNP as PCP - Empaneled Provider    Medical History Review  Past Medical, Family, and Social History reviewed and  contribute to the patient presenting condition    Health Maintenance   Topic Date Due    Depression Monitoring  02/09/2024    Flu vaccine (1) 08/01/2024    COVID-19 Vaccine (1 - 2023-24 season) Never done    DTaP/Tdap/Td vaccine (7 - Td or Tdap) 09/15/2026    Hepatitis A vaccine  Completed    Hepatitis B vaccine  Completed    Hib vaccine  Completed    HPV vaccine  Completed    Polio vaccine  Completed    Varicella vaccine  Completed    Meningococcal (ACWY) vaccine  Completed    Hepatitis C screen  Completed    HIV screen  Completed    Pneumococcal 0-64 years Vaccine  Aged Out     After obtaining consent, and per orders of Liana Mistry CNP, injection of flu given in Left deltoid by Mary Pryor MA. Patient instructed to remain in clinic for 20 minutes afterwards, and to report any adverse reaction to me immediately.

## 2024-09-30 NOTE — PROGRESS NOTES
Well Adult Note  Name: Yovani Ennis Today’s Date: 2024   MRN: [id number] Sex: Male   Age: 24 y.o. Ethnicity: Non- / Non    : 2000 Race: White (non-)      Yovani Ennis is here for a well adult exam.       Subjective   History:  Doing well overall. Living with girlfriend. Does have some ongoing anxiety/depression. Using hydroxyzine as needed but makes him tired the next day. Not seeing any mental health counselor at this time. Would like to restart prozac as he felt best on this in the past with minimal side effects compared to trintellix, wellbutrin.     Review of Systems   Constitutional:  Negative for activity change, chills, fatigue, fever and unexpected weight change.   HENT:  Negative for congestion, ear pain, postnasal drip, rhinorrhea and sore throat.    Eyes:  Negative for pain and visual disturbance.   Respiratory:  Negative for cough, chest tightness and shortness of breath.    Cardiovascular:  Negative for chest pain, palpitations and leg swelling.   Gastrointestinal:  Positive for anal bleeding (at times after using nsaids for periods of times). Negative for abdominal pain, constipation, diarrhea, nausea, rectal pain and vomiting.        Gerd   Endocrine: Negative for polydipsia and polyuria.   Genitourinary:  Negative for difficulty urinating, dysuria, hematuria, penile pain, penile swelling, scrotal swelling and testicular pain.   Skin:  Negative for color change.   Neurological:  Negative for weakness, numbness and headaches.   Psychiatric/Behavioral:  Positive for sleep disturbance (from stress). Negative for dysphoric mood. The patient is nervous/anxious (stress with life/work).        No Known Allergies  Prior to Visit Medications    Medication Sig Taking? Authorizing Provider   FLUoxetine (PROZAC) 20 MG capsule Take 1 capsule by mouth daily Yes Liana Mistry, APRN - CNP   Cyanocobalamin (VITAMIN B 12) 500 MCG TABS Take by mouth Yes Provider,

## 2025-04-13 RX ORDER — FLUOXETINE HYDROCHLORIDE 40 MG/1
40 CAPSULE ORAL DAILY
Qty: 30 CAPSULE | Refills: 3 | Status: SHIPPED | OUTPATIENT
Start: 2025-04-13

## 2025-05-14 NOTE — PROGRESS NOTES
Visit Information    Have you changed or started any medications since your last visit including any over-the-counter medicines, vitamins, or herbal medicines? no   Have you stopped taking any of your medications? Is so, why? -  no  Are you having any side effects from any of your medications? - no    Have you seen any other physician or provider since your last visit?  no   Have you had any other diagnostic tests since your last visit?  no   Have you been seen in the emergency room and/or had an admission in a hospital since we last saw you?  no   Have you had your routine dental cleaning in the past 6 months?  no     Do you have an active MyChart account? If no, what is the barrier?   Yes    Patient Care Team:  Loy Simmonds, APRN - CNP as PCP - General (Family Nurse Practitioner)  Loy Simmonds, APRN - CNP as PCP - [facility] Provider    Medical History Review  Past Medical, Family, and Social History reviewed and  contribute to the patient presenting condition    Health Maintenance   Topic Date Due    COVID-19 Vaccine (1) Never done    Flu vaccine (Season Ended) 03/08/2023 (Originally 9/1/2022)    Depression Monitoring  04/25/2023    DTaP/Tdap/Td vaccine (7 - Td or Tdap) 09/15/2026    Hepatitis A vaccine  Completed    Hepatitis B vaccine  Completed    Hib vaccine  Completed    HPV vaccine  Completed    Varicella vaccine  Completed    Meningococcal (ACWY) vaccine  Completed    Hepatitis C screen  Completed    HIV screen  Completed    Pneumococcal 0-64 years Vaccine  Aged Out Opt out